# Patient Record
Sex: FEMALE | Race: WHITE | NOT HISPANIC OR LATINO | Employment: FULL TIME | URBAN - METROPOLITAN AREA
[De-identification: names, ages, dates, MRNs, and addresses within clinical notes are randomized per-mention and may not be internally consistent; named-entity substitution may affect disease eponyms.]

---

## 2018-01-18 ENCOUNTER — HISTORICAL (OUTPATIENT)
Dept: ADMINISTRATIVE | Facility: HOSPITAL | Age: 38
End: 2018-01-18

## 2018-01-20 LAB — FINAL CULTURE: NORMAL

## 2019-05-07 ENCOUNTER — OFFICE VISIT (OUTPATIENT)
Dept: UROLOGY | Facility: CLINIC | Age: 39
End: 2019-05-07
Payer: COMMERCIAL

## 2019-05-07 ENCOUNTER — TELEPHONE (OUTPATIENT)
Dept: UROLOGY | Facility: CLINIC | Age: 39
End: 2019-05-07

## 2019-05-07 VITALS
WEIGHT: 293 LBS | BODY MASS INDEX: 41.02 KG/M2 | HEIGHT: 71 IN | HEART RATE: 91 BPM | DIASTOLIC BLOOD PRESSURE: 94 MMHG | SYSTOLIC BLOOD PRESSURE: 143 MMHG | TEMPERATURE: 98 F

## 2019-05-07 DIAGNOSIS — N28.89 LEFT RENAL MASS: Primary | ICD-10-CM

## 2019-05-07 LAB
BILIRUB UR QL STRIP: NEGATIVE
CLARITY UR REFRACT.AUTO: ABNORMAL
COLOR UR AUTO: YELLOW
GLUCOSE UR QL STRIP: NEGATIVE
HGB UR QL STRIP: NEGATIVE
KETONES UR QL STRIP: NEGATIVE
LEUKOCYTE ESTERASE UR QL STRIP: NEGATIVE
NITRITE UR QL STRIP: NEGATIVE
PH UR STRIP: 6 [PH] (ref 5–8)
PROT UR QL STRIP: NEGATIVE
SP GR UR STRIP: 1.02 (ref 1–1.03)
URN SPEC COLLECT METH UR: ABNORMAL

## 2019-05-07 PROCEDURE — 87086 URINE CULTURE/COLONY COUNT: CPT

## 2019-05-07 PROCEDURE — 3008F PR BODY MASS INDEX (BMI) DOCUMENTED: ICD-10-PCS | Mod: CPTII,S$GLB,, | Performed by: UROLOGY

## 2019-05-07 PROCEDURE — 99999 PR PBB SHADOW E&M-NEW PATIENT-LVL III: CPT | Mod: PBBFAC,,, | Performed by: UROLOGY

## 2019-05-07 PROCEDURE — 3008F BODY MASS INDEX DOCD: CPT | Mod: CPTII,S$GLB,, | Performed by: UROLOGY

## 2019-05-07 PROCEDURE — 99999 PR PBB SHADOW E&M-NEW PATIENT-LVL III: ICD-10-PCS | Mod: PBBFAC,,, | Performed by: UROLOGY

## 2019-05-07 PROCEDURE — 99204 OFFICE O/P NEW MOD 45 MIN: CPT | Mod: S$GLB,,, | Performed by: UROLOGY

## 2019-05-07 PROCEDURE — 99204 PR OFFICE/OUTPT VISIT, NEW, LEVL IV, 45-59 MIN: ICD-10-PCS | Mod: S$GLB,,, | Performed by: UROLOGY

## 2019-05-07 PROCEDURE — 81003 URINALYSIS AUTO W/O SCOPE: CPT

## 2019-05-07 RX ORDER — PANTOPRAZOLE SODIUM 40 MG/1
40 TABLET, DELAYED RELEASE ORAL NIGHTLY
Refills: 11 | COMMUNITY
Start: 2019-04-05

## 2019-05-07 NOTE — H&P (VIEW-ONLY)
Subjective:       Patient ID: Betty Faustin is a 38 y.o. female.    Chief Complaint:  Other (renal mass)      History of Present Illness  HPI  Patient is a 38 y.o. female who is new to our clinic and referred by their urologist, Dr. Villarreal for evaluation of a left renal mass.   She developed diarrhea and GI upset which prompted abdominal imaging.  She had an incidental finding of a left renal mass.    She denies any nausea/vomiting.  She denies gross hematuria.  No fh of  malignancy.   Remote history of lap CCY and .  No blood thinners.  No MI/CVA in the past.  No chest pain or shortness of breath.     Review of Systems  Review of Systems  All other systems reviewed and negative except pertinent positives noted in HPI.       Objective:     Physical Exam   Constitutional: She is oriented to person, place, and time. She appears well-developed and well-nourished. She is cooperative.  Non-toxic appearance.   HENT:   Head: Normocephalic.   Cardiovascular: Normal rate, intact distal pulses and normal pulses.    Pulmonary/Chest: Effort normal. No accessory muscle usage. No tachypnea. No respiratory distress.   Abdominal: Soft. Normal appearance. She exhibits no distension, no fluid wave, no ascites and no mass. There is no tenderness. There is no rebound and no CVA tenderness. No hernia.   Liver/spleen non-palpable   Musculoskeletal: Normal range of motion.   Neurological: She is alert and oriented to person, place, and time. She has normal strength.   Skin: No bruising and no rash noted.     Psychiatric: She has a normal mood and affect. Her speech is normal and behavior is normal. Thought content normal.       Lab Review  No results found for: COLORU, SPECGRAV, PHUR, WBCUR, NITRITE, PROTEINUR, GLUCOSEUR, KETONESU, UROBILINOGEN, BILIRUBINUR, RBCUR      Assessment:        1. Left renal mass            Plan:     Left renal mass  -     Urine culture; Future; Expected date: 2019  -     Urinalysis; Future;  Expected date: 05/07/2019    -CT scan was independently reviewed today and reveals partially endophytic left upper pole renal mass, 2.6cm, RENAL: 1, 2, 2, a, 1    -I have explained the risk, benefits, and alternatives of the procedure in detail.  The risks including but not limited to bleeding, pseudoaneurysm, AVM, injury to adjacent structures including the spleen, liver, lung, pancreas, colon and intestines, blood vessels in the abdomen including the renal artery or renal vein, ureter, loss of kidney, urinoma or urinary fistula, or need for conversion to open or radical nephrectomy were explained to the patient in depth. The patient voices understanding and all questions have been answered. The patient agrees to proceed as planned with a left robotic partial nephrectomy.  -ua/urine culture today  -f/u for surgery 5/31.

## 2019-05-07 NOTE — LETTER
May 7, 2019      Kyle Marks MD  [address]  [place] [address]           [facility]  [address]  [place] [address]  Phone: [phone]          Patient: Betty Faustin   MR Number: [id number]   YOB: 1980   Date of Visit: 5/7/2019       Dear Dr. Kyle Marks:    Thank you for referring Betty Faustin to me for evaluation. Attached you will find relevant portions of my assessment and plan of care.    If you have questions, please do not hesitate to call me. I look forward to following Betty Faustin along with you.    Sincerely,    Srinath Killian MD    Enclosure  CC:  No Recipients    If you would like to receive this communication electronically, please contact [email or web address] or [phone] to request more information on [employer] Link access.    For providers and/or their staff who would like to refer a patient to [facility], please contact us through our one-stop-shop provider referral line, [facility], at [phone].    If you feel you have received this communication in error or would no longer like to receive these types of communications, please e-mail [email or web address]

## 2019-05-08 LAB — BACTERIA UR CULT: NO GROWTH

## 2019-05-09 ENCOUNTER — TELEPHONE (OUTPATIENT)
Dept: UROLOGY | Facility: CLINIC | Age: 39
End: 2019-05-09

## 2019-05-10 ENCOUNTER — TELEPHONE (OUTPATIENT)
Dept: PREADMISSION TESTING | Facility: HOSPITAL | Age: 39
End: 2019-05-10

## 2019-05-10 ENCOUNTER — ANESTHESIA EVENT (OUTPATIENT)
Dept: SURGERY | Facility: HOSPITAL | Age: 39
DRG: 657 | End: 2019-05-10
Payer: COMMERCIAL

## 2019-05-10 DIAGNOSIS — Z01.818 PREOPERATIVE TESTING: Primary | ICD-10-CM

## 2019-05-10 NOTE — TELEPHONE ENCOUNTER
----- Message from Hue Gutierrez RN sent at 5/10/2019 10:23 AM CDT -----  Surgery date: 5/31/2019  PreOp appt: Pt requesting AM appts on 5/30/2019, she is coming from [place], [place].    Please call Pt and schedule the following preop appts:    POC  Dr. Rahman  Lab    Thank you!  Hue

## 2019-05-10 NOTE — PRE ADMISSION SCREENING
Anesthesia Assessment: Preoperative EQUATION    Planned Procedure: Procedure(s) (LRB):  XI ROBOTIC NEPHRECTOMY, PARTIAL (Left)  Requested Anesthesia Type:General  Surgeon: Srinath Killian MD  Service: Urology  Known or anticipated Date of Surgery:5/31/2019    Surgeon notes: reviewed    Previous anesthesia records:Not available    Last PCP note: Pt stated does not see a PCP  Subspecialty notes: None    Other important co-morbidities: Renal Mass     Tests already available:  No recent tests.            Instructions given. (See in Nurse's note)    Optimization:  Anesthesia Preop Clinic Assessment  Indicated.    Medical Opinion Indicated.           Plan:    Testing:  Hematology Profile, CMP and T&S   Pre-anesthesia  visit       Visit focus: concerns in complex and/or prolonged anesthesia, position other than supine     Consultation:IM Perioperative Hospitalist     Patient  has previously scheduled Medical Appointment: Not at this time prior to surgery    Navigation: Tests Scheduled.              Consults scheduled.             Results will be tracked by Preop Clinic.    Case presented to and discussed with Dr. Burt, no further testing needed (EKG).

## 2019-05-10 NOTE — ANESTHESIA PREPROCEDURE EVALUATION
Hue Gutierrez, RN   Registered Nurse      Pre Admission Screening   Signed                     Anesthesia Assessment: Preoperative EQUATION     Planned Procedure: Procedure(s) (LRB):  XI ROBOTIC NEPHRECTOMY, PARTIAL (Left)  Requested Anesthesia Type:General  Surgeon: Srinath Killian MD  Service: Urology  Known or anticipated Date of Surgery:2019     Surgeon notes: reviewed     Previous anesthesia records:Not available     Last PCP note: Pt stated does not see a PCP  Subspecialty notes: None     Other important co-morbidities: Renal Mass     Tests already available:  No recent tests.                            Instructions given. (See in Nurse's note)     Optimization:  Anesthesia Preop Clinic Assessment  Indicated.    Medical Opinion Indicated.                                        Plan:    Testing:  Hematology Profile, CMP and T&S   Pre-anesthesia  visit                                        Visit focus: concerns in complex and/or prolonged anesthesia, position other than supine                           Consultation:IM Perioperative Hospitalist                           Patient  has previously scheduled Medical Appointment: Not at this time prior to surgery     Navigation: Tests Scheduled.                         Consults scheduled.                        Results will be tracked by Preop Clinic.     Case presented to and discussed with Dr. Burt, no further testing needed (EKG).                                                                                                                           05/10/2019  Betty Faustin is a 38 y.o., female.    Anesthesia Evaluation         Review of Systems  Anesthesia Hx:  No problems with previous Anesthesia History of prior surgery of interest to airway management or planning: Previous anesthesia: General 2008: Emergency  with general anesthesia.  Denies Family Hx of Anesthesia complications.   Denies Personal Hx of Anesthesia complications.    Social:  Non-Smoker  Patient's occupation is . Denies Tobacco Use. Denies Alcohol Use.   EENT/Dental:   Denies Throat Symptoms Denies Jaw Problems   Cardiovascular:  Functional Capacity good / => 4 METS, can walk up one flight of stairs: denies CP/SOB  Denies Coronary Artery Disease.  Denies Deep Venous Thrombosis (DVT)  Hypertension (no meds at this time- in past)    Pulmonary:  Pulmonary Normal  Denies Asthma.  Denies Chronic Obstructive Pulmonary Disease (COPD).  Possible Obstructive Sleep Apnea , (STOP/BANG) Symptoms A - Age > 50    Renal/:   Left renal mass Denies Kidney Function/Disease    Hepatic/GI:   GERD, well controlled  Esophageal / Stomach Disorders Gerd Controlled by chronic antireflux medication.  Denies Liver Disease    Musculoskeletal:  Denies Joint Disease     Neurological:  Neurology Normal  Denies Seizure Disorder  Denies CVA - Cerebrovasular Accident  Denies TIA - Transient Ischemic Attack    Endocrine:  Endocrine Normal  Denies Diabetes  Denies Thyroid Disease  Metabolic Disorders, Morbid Obesity / BMI > 40      Physical Exam  General:  Morbid Obesity    Airway/Jaw/Neck:  Airway Findings: Mouth Opening: Normal Tongue: Normal  General Airway Assessment: Adult  Improves to II with phonation.  TM Distance: Normal, at least 6 cm  Jaw/Neck Findings:  Neck ROM: Normal ROM      Dental:  Dental Findings: In tact        Mental Status:  Mental Status Findings:  Cooperative, Alert and Oriented         Anesthesia Plan  Type of Anesthesia, risks & benefits discussed:  Anesthesia Type:  general  Patient's Preference:   Intra-op Monitoring Plan: standard ASA monitors and arterial line  Intra-op Monitoring Plan Comments:   Post Op Pain Control Plan: per primary service following discharge from PACU and multimodal analgesia  Post Op Pain Control Plan Comments:   Induction:   IV  Beta Blocker:  Patient is not currently on a Beta-Blocker (No further documentation required).       Informed Consent:  Patient understands risks and agrees with Anesthesia plan.  Questions answered. Anesthesia consent signed with patient.  ASA Score: 2     Day of Surgery Review of History & Physical:    H&P update referred to the surgeon.         Ready For Surgery From Anesthesia Perspective.     The patient was seen by Perioperative Internal Medicine physician Dr. Rahman on 5/30/19 , please see recommendations.        Hattie Meyer RN

## 2019-05-10 NOTE — PRE-PROCEDURE INSTRUCTIONS
Reviewed instructions regarding holding vitamins, herbal supplements and NSAIDS one week prior to surgery;  Pt verbalized understanding.

## 2019-05-10 NOTE — TELEPHONE ENCOUNTER
Appt. 5/30  When I spoke with patient she said, She was told that she could come in the day before surgery.

## 2019-05-30 ENCOUNTER — HOSPITAL ENCOUNTER (OUTPATIENT)
Dept: CARDIOLOGY | Facility: CLINIC | Age: 39
Discharge: HOME | End: 2019-05-30
Payer: COMMERCIAL

## 2019-05-30 ENCOUNTER — HOSPITAL ENCOUNTER (OUTPATIENT)
Dept: PREADMISSION TESTING | Facility: HOSPITAL | Age: 39
Discharge: HOME | DRG: 657 | End: 2019-05-30
Attending: ANESTHESIOLOGY
Payer: COMMERCIAL

## 2019-05-30 ENCOUNTER — INITIAL CONSULT (OUTPATIENT)
Dept: INTERNAL MEDICINE | Facility: CLINIC | Age: 39
DRG: 657 | End: 2019-05-30
Payer: COMMERCIAL

## 2019-05-30 VITALS
DIASTOLIC BLOOD PRESSURE: 88 MMHG | HEART RATE: 79 BPM | WEIGHT: 293 LBS | BODY MASS INDEX: 41.02 KG/M2 | HEIGHT: 71 IN | TEMPERATURE: 98 F | OXYGEN SATURATION: 97 % | SYSTOLIC BLOOD PRESSURE: 126 MMHG

## 2019-05-30 DIAGNOSIS — I10 ESSENTIAL HYPERTENSION: ICD-10-CM

## 2019-05-30 DIAGNOSIS — R79.89 ELEVATED PLATELET COUNT: ICD-10-CM

## 2019-05-30 DIAGNOSIS — E66.01 MORBID OBESITY: ICD-10-CM

## 2019-05-30 DIAGNOSIS — I10 ESSENTIAL HYPERTENSION: Primary | ICD-10-CM

## 2019-05-30 DIAGNOSIS — Z01.818 PREOP EXAMINATION: Primary | ICD-10-CM

## 2019-05-30 DIAGNOSIS — K21.9 GASTROESOPHAGEAL REFLUX DISEASE, ESOPHAGITIS PRESENCE NOT SPECIFIED: ICD-10-CM

## 2019-05-30 DIAGNOSIS — N28.89 RENAL MASS: ICD-10-CM

## 2019-05-30 PROCEDURE — 99244 PR OFFICE CONSULTATION,LEVEL IV: ICD-10-PCS | Mod: S$GLB,,, | Performed by: HOSPITALIST

## 2019-05-30 PROCEDURE — 99244 OFF/OP CNSLTJ NEW/EST MOD 40: CPT | Mod: S$GLB,,, | Performed by: HOSPITALIST

## 2019-05-30 PROCEDURE — 99999 PR PBB SHADOW E&M-EST. PATIENT-LVL II: ICD-10-PCS | Mod: PBBFAC,,, | Performed by: HOSPITALIST

## 2019-05-30 PROCEDURE — 99999 PR PBB SHADOW E&M-EST. PATIENT-LVL II: CPT | Mod: PBBFAC,,, | Performed by: HOSPITALIST

## 2019-05-30 PROCEDURE — 93010 EKG 12-LEAD: ICD-10-PCS | Mod: S$GLB,,, | Performed by: INTERNAL MEDICINE

## 2019-05-30 PROCEDURE — 93010 ELECTROCARDIOGRAM REPORT: CPT | Mod: S$GLB,,, | Performed by: INTERNAL MEDICINE

## 2019-05-30 PROCEDURE — 93005 ELECTROCARDIOGRAM TRACING: CPT | Mod: S$GLB,,, | Performed by: ANESTHESIOLOGY

## 2019-05-30 PROCEDURE — 93005 EKG 12-LEAD: ICD-10-PCS | Mod: S$GLB,,, | Performed by: ANESTHESIOLOGY

## 2019-05-30 NOTE — ASSESSMENT & PLAN NOTE
Was on treatment for HTN  Had to have emergency C section in 2008 because of HTN -   Was on treatment about 4515-8487   Restarted BP Medication 1855-2905   Could not alannah the name ( possibly Metoprolol )   Home BP readings -120-140/80-90  Hypertension-  Blood pressure is acceptable .I suggest  blood pressure monitoring .I suggest addressing pain control as uncontrolled pain can increased blood pressure   She plans on following for HTN  Weight loss , salt reduction suggested

## 2019-05-30 NOTE — OUTPATIENT SUBJECTIVE & OBJECTIVE
Outpatient Subjective & Objective     Chief complaint-Preoperative evaluation, Perioperative Medical management, complication reduction plan     Active cardiac conditions- none    Revised cardiac risk index predictors- none    Functional capacity -Examples of physical activity  house work, can take 1 flight of stairs, cutting the grass with a mower, painting and weeding garden----- She can undertake all the above activities without  chest pain,chest tightness, Shortness of breath ,dizziness,lightheadedness making her exercise tolerance more,   than 4 Mets.       Review of Systems   Constitutional: Negative for chills and fever.        No unusual weight changes recently        HENT:        STOPBANG score  3/ 8    HTN  BMI> 35   Neck size over 40 CM     Eyes:        No new visual changes   Respiratory:        No cough , phlegm    No Hemoptysis   Cardiovascular:        As noted   Gastrointestinal:        No overt GI/ blood losses  Bowel movements- Regular   LMP- 5/21/2019   Regular cycles, not heavy   Had tubal ligation with the last child   Endocrine:        Prednisone use > 20 mg daily for 3 weeks- None    Genitourinary: Negative for dysuria.        No urinary hesitancy    Musculoskeletal:          No unusual, muscle, joint pains   Skin: Negative for rash.   Neurological: Negative for syncope.        No unilateral weakness   Hematological:        Current use of Anticoagulants  Current use of Antiplatelet agents  None    Psychiatric/Behavioral:        No Depression,Anxiety     no vascular stenting     No past medical history pertinent negatives.  No family history on file.  No past surgical history on file.    No anesthesia, bleeding, cardiac problems , PONV with previous surgeries/procedures.  Medications and Allergies reviewed in epic.   FH- No anesthesia,bleeding / venous thrombosis ,  in family   Gets cancer screening   Physical Exam      Physical Exam  Constitutional-   General  appearance-Conscious,Coherent  Eyes- No conjunctival icterus,pupils  round  and reactive to light   ENT-Oral cavity- moist  , Hearing grossly normal   Neck- No thyromegaly ,Trachea -central, No jugular venous distension,   No Carotid Bruit   Cardiovascular -Heart Sounds- Normal  and  no murmur   , No gallop rhythm   Respiratory - Normal Respiratory Effort, Normal breath sounds,  no wheeze  and  no forced expiratory wheeze    Peripheral pitting pedal edema-- none , no calf pain   Gastrointestinal -Soft abdomen, No palpable masses, Non Tender,Liver,Spleen not palpable. No-- free fluid and shifting dullness  Musculoskeletal- No finger Clubbing. Strength grossly normal   Lymphatic-No Palpable cervical, axillary,Inguinal lymphadenopathy   Psychiatric - normal effect,Orientation  Rt Dorsalis pedis pulses-palpable    Lt Dorsalis pedis pulses- palpable   Rt Posterior tibial pulses -palpable   Left posterior tibial pulses -palpable   Miscellaneous - no sign of infection  open area skin , lower abdomen ,  no asterixis,  no dupuytren's contracture and  no renal bruit  Investigations  Possible pancreas cysts on MRI- suggested follow up       Review of old records- Was done and information gathered regards to events leading to surgery and health conditions of significance in the perioperative period.    Outpatient Subjective & Objective

## 2019-05-30 NOTE — PROGRESS NOTES
[facility] - Pre Op Consult  Progress Note    Patient Name: Betty Faustin  MRN: [id number]  Date of Evaluation- 05/30/2019  PCP- Primary Doctor No    Future cases for Betty Faustin [[id number]]     Case ID Status Date Time [place] Procedure Provider Location    [id number] [facility] 5/31/2019 12:00  XI ROBOTIC NEPHRECTOMY, PARTIAL Srinath Killian MD [[id number]] [facility] OR 2ND FLR      Left     HPI:  History of present illness- I had the pleasure of meeting this pleasant 38 y.o. lady in the pre op clinic prior to her elective Urological surgery. The patient is new to me     I have obtained the history by speaking to the patient and by reviewing the electronic health records.    Events leading up to surgery / History of presenting illness -    Left renal mass    Went to Emergency room March 17 th 2019 with diarrhea, abdominal pain ( upper abdomina, no radiation , no relation to food ) . No similar symptoms in the family ( spouse , 2 children )  Lasted for 4 days . During the evaluation of abdominal pain , was incidentally noted to have Left renal mass  Was given antibiotic Ciprofloxacin for 10 days and was discharged. The  diarrhea , and abdominal lasted for 1-2 days and resolved . Finished the antibiotic course that she tolerated well   Saw local urologist and was referred to [facility] , but had insurance issues and hence came to [facility]     No pain from this .No aggravating factors. No relieving factors     Relevant health conditions of significance for the perioperative period/ History of presenting illness -    Lives with  and 2 boys ( 10 Y, 1.5 Y )  Works as a  at [employer]    Physical activities- House work, stays active   Lives in a farm , has chickens, ducks   Doers fishing , camping  Lives in a single house     Patient Active Problem List    Diagnosis Date Noted    Renal mass 05/30/2019    Acid reflux 05/30/2019    Morbid obesity 05/30/2019    Essential hypertension 05/30/2019    Elevated platelet  count 05/30/2019     Not known to have heart disease , Diabetes Mellitus, Lung disease       Subjective/ Objective:          Chief complaint-Preoperative evaluation, Perioperative Medical management, complication reduction plan     Active cardiac conditions- none    Revised cardiac risk index predictors- none    Functional capacity -Examples of physical activity  house work, can take 1 flight of stairs, cutting the grass with a mower, painting and weeding garden----- She can undertake all the above activities without  chest pain,chest tightness, Shortness of breath ,dizziness,lightheadedness making her exercise tolerance more,   than 4 Mets.       Review of Systems   Constitutional: Negative for chills and fever.        No unusual weight changes recently        HENT:        STOPBANG score  3/ 8    HTN  BMI> 35   Neck size over 40 CM     Eyes:        No new visual changes   Respiratory:        No cough , phlegm    No Hemoptysis   Cardiovascular:        As noted   Gastrointestinal:        No overt GI/ blood losses  Bowel movements- Regular   LMP- 5/21/2019   Regular cycles, not heavy   Had tubal ligation with the last child   Endocrine:        Prednisone use > 20 mg daily for 3 weeks- None    Genitourinary: Negative for dysuria.        No urinary hesitancy    Musculoskeletal:          No unusual, muscle, joint pains   Skin: Negative for rash.   Neurological: Negative for syncope.        No unilateral weakness   Hematological:        Current use of Anticoagulants  Current use of Antiplatelet agents  None    Psychiatric/Behavioral:        No Depression,Anxiety     no vascular stenting     No past medical history pertinent negatives.  No family history on file.  No past surgical history on file.    No anesthesia, bleeding, cardiac problems , PONV with previous surgeries/procedures.  Medications and Allergies reviewed in epic.   FH- No anesthesia,bleeding / venous thrombosis ,  in family   Gets cancer screening    Physical Exam      Physical Exam  Constitutional-   General appearance-Conscious,Coherent  Eyes- No conjunctival icterus,pupils  round  and reactive to light   ENT-Oral cavity- moist  , Hearing grossly normal   Neck- No thyromegaly ,Trachea -central, No jugular venous distension,   No Carotid Bruit   Cardiovascular -Heart Sounds- Normal  and  no murmur   , No gallop rhythm   Respiratory - Normal Respiratory Effort, Normal breath sounds,  no wheeze  and  no forced expiratory wheeze    Peripheral pitting pedal edema-- none , no calf pain   Gastrointestinal -Soft abdomen, No palpable masses, Non Tender,Liver,Spleen not palpable. No-- free fluid and shifting dullness  Musculoskeletal- No finger Clubbing. Strength grossly normal   Lymphatic-No Palpable cervical, axillary,Inguinal lymphadenopathy   Psychiatric - normal effect,Orientation  Rt Dorsalis pedis pulses-palpable    Lt Dorsalis pedis pulses- palpable   Rt Posterior tibial pulses -palpable   Left posterior tibial pulses -palpable   Miscellaneous - no sign of infection  open area skin , lower abdomen ,  no asterixis,  no dupuytren's contracture and  no renal bruit  Investigations  Possible pancreas cysts on MRI- suggested follow up       Review of old records- Was done and information gathered regards to events leading to surgery and health conditions of significance in the perioperative period.        Preoperative cardiac risk assessment-  The patient does not have any active cardiac conditions . Revised cardiac risk index predictors -0 - ---.Functional capacity is more than 4 Mets. She will be undergoing a Urological procedure that carries a intermediate risk     Risk of a major Cardiac event ( Defined as death, myocardial infarction, or cardiac arrest at 30 days after noncardiac surgery), based on RCRI score     -3.9%       No further cardiac work up is indicated prior to proceeding with the surgery     Orders Placed This Encounter    Hemoglobin A1c        American Society of Anesthesiologists Physical status classification ( ASA ) class: 3     Postoperative pulmonary complication risk assessment:      ARISCAT ( Canet) risk index- risk class -  Low     Arozullah Respiratory failure index- percentage risk of respiratory failure: 0.5 %     Assessment/Plan:     Renal mass  For surgery    Acid reflux  Feels chest discomfort - upper chest, throat   Burning  Notices , if misses Protonix for 1 day   On Protonix since pregnant with the first child ( since 2008 )  Protonix helps  Not related to physical activities  Triggers- Food, weight , Caffeine , Chocolate     Morbid obesity  Not known to  have sleep apnea , diabetes , fatty liver  No consistent snoring ,no apnea  Encouraged weight loss    Essential hypertension  Was on treatment for HTN  Had to have emergency C section in 2008 because of HTN -   Was on treatment about 5578-3051   Restarted BP Medication 5564-6590   Could not alannah the name ( possibly Metoprolol )   Home BP readings -120-140/80-90  Hypertension-  Blood pressure is acceptable .I suggest  blood pressure monitoring .I suggest addressing pain control as uncontrolled pain can increased blood pressure   She plans on following for HTN  Weight loss , salt reduction suggested     Elevated platelet count  Platelet count mildly elevated 496 from 5/30/3019 - suggest follow up         Preventive perioperative care    Thromboembolic prophylaxis:  Her risk factors for thrombosis include morbid obesity and surgical procedure.I suggest  thromboembolic prophylaxis ( mechanical/pharmacological, weighing the risk benefits of pharmacological agent use considering [facility]ris procedural bleeding )  during the perioperative period.I suggested being active in the post operative period.      Postoperative pulmonary complication prophylaxis-Risk factors for post operative pulmonary complications include ASA class >2 and proximity of the surgical site to the lungs- I suggest incentive  "spirometry use, early ambulation, end tidal carbon dioxide monitoring and pain control so as to avoid diaphragmatic splinting  , oral care , head end of bed elevation      Renal complication prophylaxis- I suggest keeping her well hydrated .I suggested drinking 2 litre's of water a day   Suggested avoidance of soft drinks - contribute to weight      Surgical site Infection Prophylaxis-I  suggest appropriate antibiotic for Prophylaxis against Surgical site infections     In view of urological procedure the patient  is at risk of postoperative urinary retention.  I suggest avoidance / minimizing the of  Benzodiazepines,Anticholinergic medication,antihistamines ( Benadryl) , if possible in the perioperative period. I suggest using the minimum possible use of opioids for the minimum period of time in the perioperative period. Benadryl avoidance suggested      This visit was focused on Preoperative evaluation, Perioperative Medical management, complication reduction plans. I suggest that the patient follows up with primary care or relevant sub specialists for ongoing health care.    I appreciate the opportunity to be involved in this patients care. Please feel free to contact me if there were any questions about this consultation.    Patient is optimized     Patient  was instructed to call and update me about any changes to health,  medication, office visits ,testing out side of the [facility] , hospitalizations between now and surgery     Sumi Rahman MD  Perioperative Medicine  [facility]   Pager [phone]  ---    5/30- 15 43     CMP- apart from , mildly low anion gap is unremarkable   A1c - N at 5.1   Hb, HCT -N  Platelet count mildly elevated 496 from 5/30/3019 - suggest follow up   /88 Comment: right  Pulse 79   Temp 97.9 °F (36.6 °C)   Ht 5' 11" (1.803 m)   Wt 133.9 kg (295 lb 3.2 oz)   SpO2 97%   BMI 41.17 kg/m²      Called and spoke to her   Elevated PLT count " discussed  Suggested repeat count in 4-8 weeks  Called to follow up , spoke to her  to address any concerns with the up coming surgery or any questions on Medication instructions   Not  breast feeding   Call, if needed

## 2019-05-30 NOTE — ASSESSMENT & PLAN NOTE
Feels chest discomfort - upper chest, throat   Burning  Notices , if misses Protonix for 1 day   On Protonix since pregnant with the first child ( since 2008 )  Protonix helps  Not related to physical activities  Triggers- Food, weight , Caffeine , Chocolate

## 2019-05-30 NOTE — PATIENT INSTRUCTIONS
Tips to Control Acid Reflux    To control acid reflux, youll need to make some basic diet and lifestyle changes. The simple steps outlined below may be all youll need to ease discomfort.  Watch what you eat  · Avoid fatty foods and spicy foods.  · Eat fewer acidic foods, such as citrus and tomato-based foods. These can increase symptoms.  · Limit drinking alcohol, caffeine, and fizzy beverages. All increase acid reflux.  · Try limiting chocolate, peppermint, and spearmint. These can worsen acid reflux in some people.  Watch when you eat  · Avoid lying down for 3 hours after eating.  · Do not snack before going to bed.  Raise your head  Raising your head and upper body by 4 to 6 inches helps limit reflux when youre lying down. Put blocks under the head of your bed frame to raise it.  Other changes  · Lose weight, if you need to  · Dont exercise near bedtime  · Avoid tight-fitting clothes  · Limit aspirin and ibuprofen  · Stop smoking   Date Last Reviewed: 7/1/2016  © 2698-1935 [employer]. [address] [place] [address], [place] [address]. All rights reserved. This information is not intended as a substitute for professional medical care. Always follow your healthcare professional's instructions.

## 2019-05-30 NOTE — ASSESSMENT & PLAN NOTE
Not known to  have sleep apnea , diabetes , fatty liver  No consistent snoring ,no apnea  Encouraged weight loss

## 2019-05-30 NOTE — DISCHARGE INSTRUCTIONS
Your surgery has been scheduled for:__________________________________________    You should report to:  ____[facility], located on the [place] side of the first floor of the           [facility] ([phone])  ____[facility], located on the [facility] side of the            Second floor of the [facility] ([phone])  ____3rd Floor [facility] located on the [facility] side of the [facility] [phone]  Please Note   - Tell your doctor if you take Aspirin, products containing Aspirin, herbal medications  or blood thinners, such as Coumadin, Ticlid, or Plavix.  (Consult your provider regarding holding or stopping before surgery).  - Arrange for someone to drive you home following surgery.  You will not be allowed to leave the surgical facility alone or drive yourself home following sedation and anesthesia.  Before Surgery  - Stop taking all herbal medications 14days prior to surgery  - No Motrin/Advil (Ibuprofen) 7 days before surgery  - No Aleve (Naproxen) 7 days before surgery  - Stop Taking Asprin, products containing Asprin _____days before surgery  - Stop taking blood thinners_______days before surgery  - No Goody's/BC  Powder 7 days before surgery  - Refrain from drinking alcoholic beverages for 24hours before and after surgery  - Stop or limit smoking _________days before surgery  - You may take Tylenol for pain  Night before Surgery  Stop ALL solid food, gum, candy (including vitamins) 8 hours before arrival time.  (Please note: If your surgeon gives you different eating and drinking instructions, please follow surgeon's directions.)  Stop all CLOUDY liquids: coffee with creamer, formula, tube feeds, cloudy juices, non-human milk and breast milk with additives, 6 hours prior to arrival time.  Stop plain breast milk 4 hours prior to arrival time.  The patient should be ENCOURAGED to drink carbohydrate-rich  clear liquids (sports drinks, clear juices) until 2 hours prior to arrival time.  CLEAR liquids include only water, black coffee NO creamer, clear oral rehydration drinks, clear sports drinks or clear fruit juices (no orange juice, no pulpy juices, no apple cider). Advise patients if they can read newsprint through the liquid, it qualifies as clear liquid.   IF IN DOUBT, drink water instead.   - Take a shower or bath (shower is recommended).  Bathe with Hibiclens soap or an antibacterial soap from the neck down.  If not supplied by your surgeon, hibiclens soap will need to be purchased over the counter in pharmacy.  Rinse soap off thoroughly.  - Shampoo your hair with your regular shampoo  The Day of Surgery  · NOTHING TO  DRINK 2 hours before arrival time. If you are told to take medication on the morning of surgery, it may be taken with a sip of water.   - Take another bath or shower with hibiclens or any antibacterial soap, to reduce the chance of infection.  - Take heart and blood pressure medications with a small sip of water, as advised by the perioperative team.  - Do not take fluid pills  - You may brush your teeth and rinse your mouth, but do not swall any additional water.   - Do not apply perfumes, powder, body lotions or deodorant on the day of surgery.  - Nail polish should be removed.  - Do not wear makeup or moisturizer  - Wear comfortable clothes, such as a button front shirt and loose fitting pants.  - Leave all jewelry, including body piercings, and valuables at home.    - Bring any devices you will neeed after surgery such as crutches or canes.  - If you have sleep apnea, please bring your CPAP machine  In the event that your physical condition changes including the onset of a cold or respiratory illness, or if you have to delay or cancel your surgery, please notify your surgeon.  Anesthesia: General Anesthesia     You are watched continuously during your procedure by your anesthesia provider.      Youre due to have surgery. During surgery, youll be given medicine called anesthesia or anesthetic. This will keep you comfortable and pain-free. Your anesthesia provider will use general anesthesia.  What is general anesthesia?  General anesthesia puts you into a state like deep sleep. It goes into the bloodstream (IV anesthetics), into the lungs (gas anesthetics), or both. You feel nothing during the procedure. You will not remember it. During the procedure, the anesthesia provider monitors you continuously. He or she checks your heart rate and rhythm, blood pressure, breathing, and blood oxygen.  · IV anesthetics. IV anesthetics are given through an IV line in your arm. Theyre often given first. This is so you are asleep before a gas anesthetic is started. Some kinds of IV anesthetics relieve pain. Others relax you. Your doctor will decide which kind is best in your case.  · Gas anesthetics. Gas anesthetics are breathed into the lungs. They are often used to keep you asleep. They can be given through a facemask or a tube placed in your larynx or trachea (breathing tube).  ? If you have a facemask, your anesthesia provider will most likely place it over your nose and mouth while youre still awake. Youll breathe oxygen through the mask as your IV anesthetic is started. Gas anesthetic may be added through the mask.  ? If you have a tube in the larynx or trachea, it will be inserted into your throat after youre asleep.  Anesthesia tools and medicines  You will likely have:  · IV anesthetics. These are put into an IV line into your bloodstream.  · Gas anesthetics. You breathe these anesthetics into your lungs, where they pass into your bloodstream.  · Pulse oximeter. This is a small clip that is attached to the end of your finger. This measures your blood oxygen level.  · Electrocardiography leads (electrodes). These are small sticky pads that are placed on your chest. They record your heart rate and  rhythm.  · Blood pressure cuff. This reads your blood pressure.  Risks and possible complications  General anesthesia has some risks. These include:  · Breathing problems  · Nausea and vomiting  · Sore throat or hoarseness (usually temporary)  · Allergic reaction to the anesthetic  · Irregular heartbeat (rare)  · Cardiac arrest (rare)   Anesthesia safety  · Follow all instructions you are given for how long not to eat or drink before your procedure.  · Be sure your doctor knows what medicines and drugs you take. This includes over-the-counter medicines, herbs, supplements, alcohol or other drugs. You will be asked when those were last taken.  · Have an adult family member or friend drive you home after the procedure.  · For the first 24 hours after your surgery:  ? Do not drive or use heavy equipment.  ? Do not make important decisions or sign legal documents. If important decisions or signing legal documents is necessary during the first 24 hours after surgery, have a trusted family member or spouse act on your behalf.  ? Avoid alcohol.  ? Have a responsible adult stay with you. He or she can watch for problems and help keep you safe.  Date Last Reviewed: 12/1/2016  © 3167-9620 [employer]. [address] [place] [address], [place] [address]. All rights reserved. This information is not intended as a substitute for professional medical care. Always follow your healthcare professional's instructions

## 2019-05-30 NOTE — LETTER
May 30, 2019      Elena Burt MD  [address] [facility] [address]           [facility]  [address] [facility] [phone]  Phone: [phone]          Patient: Betty Faustin   MR Number: [id number]   YOB: 1980   Date of Visit: 5/30/2019       Dear Dr. Elena Burt:    Thank you for referring Betty Faustin to me for evaluation. Attached you will find relevant portions of my assessment and plan of care.    If you have questions, please do not hesitate to call me. I look forward to following Betty Faustin along with you.    Sincerely,    Sumi Rahman MD    Enclosure  CC:  Srinath Killian MD    If you would like to receive this communication electronically, please contact [email or web address] or [phone] to request more information on [employer] Link access.    For providers and/or their staff who would like to refer a patient to [facility], please contact us through our one-stop-shop provider referral line, [facility], at [phone].    If you feel you have received this communication in error or would no longer like to receive these types of communications, please e-mail [email or web address]

## 2019-05-30 NOTE — HPI
History of present illness- I had the pleasure of meeting this pleasant 38 y.o. lady in the pre op clinic prior to her elective Urological surgery. The patient is new to me     I have obtained the history by speaking to the patient and by reviewing the electronic health records.    Events leading up to surgery / History of presenting illness -    Left renal mass    Went to Emergency room March 17 th 2019 with diarrhea, abdominal pain ( upper abdomina, no radiation , no relation to food ) . No similar symptoms in the family ( spouse , 2 children )  Lasted for 4 days . During the evaluation of abdominal pain , was incidentally noted to have Left renal mass  Was given antibiotic Ciprofloxacin for 10 days and was discharged. The  diarrhea , and abdominal lasted for 1-2 days and resolved . Finished the antibiotic course that she tolerated well   Saw local urologist and was referred to [facility] , but had insurance issues and hence came to [facility]     No pain from this .No aggravating factors. No relieving factors     Relevant health conditions of significance for the perioperative period/ History of presenting illness -    Lives with  and 2 boys ( 10 Y, 1.5 Y )  Works as a  at [employer]    Physical activities- House work, stays active   Lives in a farm , has chickens, ducks   Doers fishing , camping  Lives in a single house     Patient Active Problem List    Diagnosis Date Noted    Renal mass 05/30/2019    Acid reflux 05/30/2019    Morbid obesity 05/30/2019    Essential hypertension 05/30/2019    Elevated platelet count 05/30/2019     Not known to have heart disease , Diabetes Mellitus, Lung disease

## 2019-05-31 ENCOUNTER — HOSPITAL ENCOUNTER (INPATIENT)
Facility: HOSPITAL | Age: 39
LOS: 1 days | Discharge: HOME | DRG: 657 | End: 2019-06-01
Attending: UROLOGY | Admitting: UROLOGY
Payer: COMMERCIAL

## 2019-05-31 ENCOUNTER — ANESTHESIA (OUTPATIENT)
Dept: SURGERY | Facility: HOSPITAL | Age: 39
DRG: 657 | End: 2019-05-31
Payer: COMMERCIAL

## 2019-05-31 DIAGNOSIS — N28.89 RENAL MASS: Primary | ICD-10-CM

## 2019-05-31 LAB
ANION GAP SERPL CALC-SCNC: 8 MMOL/L (ref 8–16)
B-HCG UR QL: NEGATIVE
BASOPHILS # BLD AUTO: 0.03 K/UL (ref 0–0.2)
BASOPHILS NFR BLD: 0.2 % (ref 0–1.9)
BUN SERPL-MCNC: 18 MG/DL (ref 6–20)
CALCIUM SERPL-MCNC: 8.5 MG/DL (ref 8.7–10.5)
CHLORIDE SERPL-SCNC: 109 MMOL/L (ref 95–110)
CO2 SERPL-SCNC: 20 MMOL/L (ref 23–29)
CREAT SERPL-MCNC: 1 MG/DL (ref 0.5–1.4)
CTP QC/QA: YES
DIFFERENTIAL METHOD: ABNORMAL
EOSINOPHIL # BLD AUTO: 0 K/UL (ref 0–0.5)
EOSINOPHIL NFR BLD: 0 % (ref 0–8)
ERYTHROCYTE [DISTWIDTH] IN BLOOD BY AUTOMATED COUNT: 14.1 % (ref 11.5–14.5)
EST. GFR  (AFRICAN AMERICAN): >60 ML/MIN/1.73 M^2
EST. GFR  (NON AFRICAN AMERICAN): >60 ML/MIN/1.73 M^2
GLUCOSE SERPL-MCNC: 132 MG/DL (ref 70–110)
HCT VFR BLD AUTO: 37.4 % (ref 37–48.5)
HGB BLD-MCNC: 11.9 G/DL (ref 12–16)
IMM GRANULOCYTES # BLD AUTO: 0.07 K/UL (ref 0–0.04)
IMM GRANULOCYTES NFR BLD AUTO: 0.5 % (ref 0–0.5)
LYMPHOCYTES # BLD AUTO: 1.1 K/UL (ref 1–4.8)
LYMPHOCYTES NFR BLD: 8.1 % (ref 18–48)
MCH RBC QN AUTO: 26.9 PG (ref 27–31)
MCHC RBC AUTO-ENTMCNC: 31.8 G/DL (ref 32–36)
MCV RBC AUTO: 84 FL (ref 82–98)
MONOCYTES # BLD AUTO: 0.2 K/UL (ref 0.3–1)
MONOCYTES NFR BLD: 1.1 % (ref 4–15)
NEUTROPHILS # BLD AUTO: 12.3 K/UL (ref 1.8–7.7)
NEUTROPHILS NFR BLD: 90.1 % (ref 38–73)
NRBC BLD-RTO: 0 /100 WBC
PLATELET # BLD AUTO: 417 K/UL (ref 150–350)
PMV BLD AUTO: 9.1 FL (ref 9.2–12.9)
POTASSIUM SERPL-SCNC: 4.3 MMOL/L (ref 3.5–5.1)
RBC # BLD AUTO: 4.43 M/UL (ref 4–5.4)
SODIUM SERPL-SCNC: 137 MMOL/L (ref 136–145)
WBC # BLD AUTO: 13.6 K/UL (ref 3.9–12.7)

## 2019-05-31 PROCEDURE — 27800505 HC CATH, RADIAL ARTERY KIT: Performed by: NURSE ANESTHETIST, CERTIFIED REGISTERED

## 2019-05-31 PROCEDURE — D9220A PRA ANESTHESIA: Mod: CRNA,,, | Performed by: NURSE ANESTHETIST, CERTIFIED REGISTERED

## 2019-05-31 PROCEDURE — D9220A PRA ANESTHESIA: Mod: ANES,,, | Performed by: ANESTHESIOLOGY

## 2019-05-31 PROCEDURE — 50543 LAPARO PARTIAL NEPHRECTOMY: CPT | Mod: AS,LT,, | Performed by: PHYSICIAN ASSISTANT

## 2019-05-31 PROCEDURE — 94799 UNLISTED PULMONARY SVC/PX: CPT

## 2019-05-31 PROCEDURE — D9220A PRA ANESTHESIA: ICD-10-PCS | Mod: ANES,,, | Performed by: ANESTHESIOLOGY

## 2019-05-31 PROCEDURE — 85025 COMPLETE CBC W/AUTO DIFF WBC: CPT

## 2019-05-31 PROCEDURE — 37000009 HC ANESTHESIA EA ADD 15 MINS: Performed by: UROLOGY

## 2019-05-31 PROCEDURE — 37000008 HC ANESTHESIA 1ST 15 MINUTES: Performed by: UROLOGY

## 2019-05-31 PROCEDURE — 11000001 HC ACUTE MED/SURG PRIVATE ROOM

## 2019-05-31 PROCEDURE — 25000003 PHARM REV CODE 250: Performed by: ANESTHESIOLOGY

## 2019-05-31 PROCEDURE — 63600175 PHARM REV CODE 636 W HCPCS: Performed by: ANESTHESIOLOGY

## 2019-05-31 PROCEDURE — S0028 INJECTION, FAMOTIDINE, 20 MG: HCPCS | Performed by: NURSE ANESTHETIST, CERTIFIED REGISTERED

## 2019-05-31 PROCEDURE — 27201423 OPTIME MED/SURG SUP & DEVICES STERILE SUPPLY: Performed by: UROLOGY

## 2019-05-31 PROCEDURE — 81025 URINE PREGNANCY TEST: CPT | Performed by: UROLOGY

## 2019-05-31 PROCEDURE — 71000039 HC RECOVERY, EACH ADD'L HOUR: Performed by: UROLOGY

## 2019-05-31 PROCEDURE — 80048 BASIC METABOLIC PNL TOTAL CA: CPT

## 2019-05-31 PROCEDURE — 36000712 HC OR TIME LEV V 1ST 15 MIN: Performed by: UROLOGY

## 2019-05-31 PROCEDURE — C1729 CATH, DRAINAGE: HCPCS | Performed by: UROLOGY

## 2019-05-31 PROCEDURE — 36620 PR INSERT CATH,ART,PERCUT,SHORTTERM: ICD-10-PCS | Mod: 59,,, | Performed by: ANESTHESIOLOGY

## 2019-05-31 PROCEDURE — 88307 TISSUE EXAM BY PATHOLOGIST: CPT | Mod: 26,,, | Performed by: PATHOLOGY

## 2019-05-31 PROCEDURE — 36620 INSERTION CATHETER ARTERY: CPT | Mod: 59,,, | Performed by: ANESTHESIOLOGY

## 2019-05-31 PROCEDURE — 63600175 PHARM REV CODE 636 W HCPCS: Performed by: UROLOGY

## 2019-05-31 PROCEDURE — 71000033 HC RECOVERY, INTIAL HOUR: Performed by: UROLOGY

## 2019-05-31 PROCEDURE — 25000003 PHARM REV CODE 250: Performed by: UROLOGY

## 2019-05-31 PROCEDURE — 88307 TISSUE EXAM BY PATHOLOGIST: CPT | Performed by: PATHOLOGY

## 2019-05-31 PROCEDURE — 63600175 PHARM REV CODE 636 W HCPCS: Performed by: STUDENT IN AN ORGANIZED HEALTH CARE EDUCATION/TRAINING PROGRAM

## 2019-05-31 PROCEDURE — 36000713 HC OR TIME LEV V EA ADD 15 MIN: Performed by: UROLOGY

## 2019-05-31 PROCEDURE — 25000003 PHARM REV CODE 250: Performed by: NURSE ANESTHETIST, CERTIFIED REGISTERED

## 2019-05-31 PROCEDURE — 76998 PR  ULTRASONIC GUIDANCE, INTRAOPERATIVE: ICD-10-PCS | Mod: 26,,, | Performed by: UROLOGY

## 2019-05-31 PROCEDURE — 76998 US GUIDE INTRAOP: CPT | Mod: 26,,, | Performed by: UROLOGY

## 2019-05-31 PROCEDURE — 50543 PR LAP,PARTIAL NEPHRECTOMY: ICD-10-PCS | Mod: AS,LT,, | Performed by: PHYSICIAN ASSISTANT

## 2019-05-31 PROCEDURE — 27000221 HC OXYGEN, UP TO 24 HOURS

## 2019-05-31 PROCEDURE — 63600175 PHARM REV CODE 636 W HCPCS: Performed by: NURSE ANESTHETIST, CERTIFIED REGISTERED

## 2019-05-31 PROCEDURE — 27100025 HC TUBING, SET FLUID WARMER: Performed by: NURSE ANESTHETIST, CERTIFIED REGISTERED

## 2019-05-31 PROCEDURE — 50543 LAPARO PARTIAL NEPHRECTOMY: CPT | Mod: 22,LT,, | Performed by: UROLOGY

## 2019-05-31 PROCEDURE — 88307 TISSUE SPECIMEN TO PATHOLOGY - SURGERY: ICD-10-PCS | Mod: 26,,, | Performed by: PATHOLOGY

## 2019-05-31 PROCEDURE — 94761 N-INVAS EAR/PLS OXIMETRY MLT: CPT

## 2019-05-31 PROCEDURE — 27201037 HC PRESSURE MONITORING SET UP

## 2019-05-31 PROCEDURE — D9220A PRA ANESTHESIA: ICD-10-PCS | Mod: CRNA,,, | Performed by: NURSE ANESTHETIST, CERTIFIED REGISTERED

## 2019-05-31 PROCEDURE — 50543 PR LAP,PARTIAL NEPHRECTOMY: ICD-10-PCS | Mod: 22,LT,, | Performed by: UROLOGY

## 2019-05-31 RX ORDER — MIDAZOLAM HYDROCHLORIDE 1 MG/ML
INJECTION, SOLUTION INTRAMUSCULAR; INTRAVENOUS
Status: DISCONTINUED | OUTPATIENT
Start: 2019-05-31 | End: 2019-05-31

## 2019-05-31 RX ORDER — OXYCODONE HYDROCHLORIDE 5 MG/1
5 TABLET ORAL EVERY 4 HOURS PRN
Status: DISCONTINUED | OUTPATIENT
Start: 2019-05-31 | End: 2019-06-01 | Stop reason: HOSPADM

## 2019-05-31 RX ORDER — PANTOPRAZOLE SODIUM 40 MG/1
40 TABLET, DELAYED RELEASE ORAL NIGHTLY
Status: DISCONTINUED | OUTPATIENT
Start: 2019-05-31 | End: 2019-06-01 | Stop reason: HOSPADM

## 2019-05-31 RX ORDER — MIDAZOLAM HYDROCHLORIDE 1 MG/ML
0.5 INJECTION INTRAMUSCULAR; INTRAVENOUS
Status: DISCONTINUED | OUTPATIENT
Start: 2019-05-31 | End: 2019-05-31

## 2019-05-31 RX ORDER — FENTANYL CITRATE 50 UG/ML
25 INJECTION, SOLUTION INTRAMUSCULAR; INTRAVENOUS EVERY 5 MIN PRN
Status: DISCONTINUED | OUTPATIENT
Start: 2019-05-31 | End: 2019-05-31

## 2019-05-31 RX ORDER — ACETAMINOPHEN 500 MG
1000 TABLET ORAL
Status: COMPLETED | OUTPATIENT
Start: 2019-05-31 | End: 2019-05-31

## 2019-05-31 RX ORDER — DOCUSATE SODIUM 100 MG/1
100 CAPSULE, LIQUID FILLED ORAL 2 TIMES DAILY PRN
Status: DISCONTINUED | OUTPATIENT
Start: 2019-05-31 | End: 2019-06-01 | Stop reason: HOSPADM

## 2019-05-31 RX ORDER — OXYCODONE HYDROCHLORIDE 10 MG/1
10 TABLET ORAL EVERY 4 HOURS PRN
Status: DISCONTINUED | OUTPATIENT
Start: 2019-05-31 | End: 2019-06-01 | Stop reason: HOSPADM

## 2019-05-31 RX ORDER — LIDOCAINE HCL/PF 100 MG/5ML
SYRINGE (ML) INTRAVENOUS
Status: DISCONTINUED | OUTPATIENT
Start: 2019-05-31 | End: 2019-05-31

## 2019-05-31 RX ORDER — FAMOTIDINE 10 MG/ML
INJECTION INTRAVENOUS
Status: DISCONTINUED | OUTPATIENT
Start: 2019-05-31 | End: 2019-05-31

## 2019-05-31 RX ORDER — KETAMINE HYDROCHLORIDE 10 MG/ML
INJECTION, SOLUTION INTRAMUSCULAR; INTRAVENOUS
Status: DISCONTINUED | OUTPATIENT
Start: 2019-05-31 | End: 2019-05-31

## 2019-05-31 RX ORDER — HEPARIN SODIUM 5000 [USP'U]/ML
5000 INJECTION, SOLUTION INTRAVENOUS; SUBCUTANEOUS EVERY 8 HOURS
Status: DISCONTINUED | OUTPATIENT
Start: 2019-05-31 | End: 2019-06-01 | Stop reason: HOSPADM

## 2019-05-31 RX ORDER — CEFAZOLIN SODIUM 1 G/3ML
2 INJECTION, POWDER, FOR SOLUTION INTRAMUSCULAR; INTRAVENOUS
Status: COMPLETED | OUTPATIENT
Start: 2019-05-31 | End: 2019-05-31

## 2019-05-31 RX ORDER — ONDANSETRON 2 MG/ML
4 INJECTION INTRAMUSCULAR; INTRAVENOUS ONCE
Status: COMPLETED | OUTPATIENT
Start: 2019-05-31 | End: 2019-05-31

## 2019-05-31 RX ORDER — FENTANYL CITRATE 50 UG/ML
INJECTION, SOLUTION INTRAMUSCULAR; INTRAVENOUS
Status: DISCONTINUED | OUTPATIENT
Start: 2019-05-31 | End: 2019-05-31

## 2019-05-31 RX ORDER — HYDROMORPHONE HYDROCHLORIDE 1 MG/ML
0.2 INJECTION, SOLUTION INTRAMUSCULAR; INTRAVENOUS; SUBCUTANEOUS EVERY 5 MIN PRN
Status: DISCONTINUED | OUTPATIENT
Start: 2019-05-31 | End: 2019-05-31 | Stop reason: HOSPADM

## 2019-05-31 RX ORDER — ONDANSETRON 2 MG/ML
INJECTION INTRAMUSCULAR; INTRAVENOUS
Status: DISCONTINUED | OUTPATIENT
Start: 2019-05-31 | End: 2019-05-31

## 2019-05-31 RX ORDER — CELECOXIB 200 MG/1
400 CAPSULE ORAL
Status: COMPLETED | OUTPATIENT
Start: 2019-05-31 | End: 2019-05-31

## 2019-05-31 RX ORDER — DIPHENHYDRAMINE HCL 25 MG
25 CAPSULE ORAL EVERY 6 HOURS PRN
Status: DISCONTINUED | OUTPATIENT
Start: 2019-05-31 | End: 2019-06-01 | Stop reason: HOSPADM

## 2019-05-31 RX ORDER — GLYCOPYRROLATE 0.2 MG/ML
INJECTION INTRAMUSCULAR; INTRAVENOUS
Status: DISCONTINUED | OUTPATIENT
Start: 2019-05-31 | End: 2019-05-31

## 2019-05-31 RX ORDER — ONDANSETRON 2 MG/ML
4 INJECTION INTRAMUSCULAR; INTRAVENOUS EVERY 8 HOURS PRN
Status: DISCONTINUED | OUTPATIENT
Start: 2019-05-31 | End: 2019-06-01 | Stop reason: HOSPADM

## 2019-05-31 RX ORDER — METOCLOPRAMIDE HYDROCHLORIDE 5 MG/ML
INJECTION INTRAMUSCULAR; INTRAVENOUS
Status: DISCONTINUED | OUTPATIENT
Start: 2019-05-31 | End: 2019-05-31

## 2019-05-31 RX ORDER — POLYETHYLENE GLYCOL 3350 17 G/17G
17 POWDER, FOR SOLUTION ORAL DAILY
Status: DISCONTINUED | OUTPATIENT
Start: 2019-06-01 | End: 2019-06-01 | Stop reason: HOSPADM

## 2019-05-31 RX ORDER — ROCURONIUM BROMIDE 10 MG/ML
INJECTION, SOLUTION INTRAVENOUS
Status: DISCONTINUED | OUTPATIENT
Start: 2019-05-31 | End: 2019-05-31

## 2019-05-31 RX ORDER — HEPARIN SODIUM 5000 [USP'U]/ML
5000 INJECTION, SOLUTION INTRAVENOUS; SUBCUTANEOUS EVERY 8 HOURS
Status: DISCONTINUED | OUTPATIENT
Start: 2019-05-31 | End: 2019-05-31

## 2019-05-31 RX ORDER — SODIUM CHLORIDE 0.9 % (FLUSH) 0.9 %
10 SYRINGE (ML) INJECTION
Status: DISCONTINUED | OUTPATIENT
Start: 2019-05-31 | End: 2019-05-31 | Stop reason: HOSPADM

## 2019-05-31 RX ORDER — SODIUM CHLORIDE 9 MG/ML
INJECTION, SOLUTION INTRAVENOUS CONTINUOUS
Status: DISCONTINUED | OUTPATIENT
Start: 2019-05-31 | End: 2019-06-01 | Stop reason: HOSPADM

## 2019-05-31 RX ORDER — PROPOFOL 10 MG/ML
VIAL (ML) INTRAVENOUS
Status: DISCONTINUED | OUTPATIENT
Start: 2019-05-31 | End: 2019-05-31

## 2019-05-31 RX ORDER — PREGABALIN 50 MG/1
150 CAPSULE ORAL
Status: COMPLETED | OUTPATIENT
Start: 2019-05-31 | End: 2019-05-31

## 2019-05-31 RX ORDER — SODIUM CHLORIDE 9 MG/ML
INJECTION, SOLUTION INTRAVENOUS CONTINUOUS PRN
Status: DISCONTINUED | OUTPATIENT
Start: 2019-05-31 | End: 2019-05-31

## 2019-05-31 RX ORDER — ACETAMINOPHEN 500 MG
1000 TABLET ORAL EVERY 6 HOURS
Status: DISCONTINUED | OUTPATIENT
Start: 2019-05-31 | End: 2019-06-01 | Stop reason: HOSPADM

## 2019-05-31 RX ORDER — DEXAMETHASONE SODIUM PHOSPHATE 4 MG/ML
INJECTION, SOLUTION INTRA-ARTICULAR; INTRALESIONAL; INTRAMUSCULAR; INTRAVENOUS; SOFT TISSUE
Status: DISCONTINUED | OUTPATIENT
Start: 2019-05-31 | End: 2019-05-31

## 2019-05-31 RX ADMIN — KETAMINE HYDROCHLORIDE 10 MG: 10 INJECTION, SOLUTION INTRAMUSCULAR; INTRAVENOUS at 02:05

## 2019-05-31 RX ADMIN — MIDAZOLAM HYDROCHLORIDE 2 MG: 1 INJECTION, SOLUTION INTRAMUSCULAR; INTRAVENOUS at 11:05

## 2019-05-31 RX ADMIN — FENTANYL CITRATE 50 MCG: 50 INJECTION, SOLUTION INTRAMUSCULAR; INTRAVENOUS at 02:05

## 2019-05-31 RX ADMIN — LIDOCAINE HYDROCHLORIDE 75 MG: 20 INJECTION, SOLUTION INTRAVENOUS at 12:05

## 2019-05-31 RX ADMIN — ACETAMINOPHEN 1000 MG: 500 TABLET ORAL at 10:05

## 2019-05-31 RX ADMIN — GLYCOPYRROLATE 0.2 MG: 0.2 INJECTION, SOLUTION INTRAMUSCULAR; INTRAVENOUS at 01:05

## 2019-05-31 RX ADMIN — ROCURONIUM BROMIDE 10 MG: 10 INJECTION, SOLUTION INTRAVENOUS at 01:05

## 2019-05-31 RX ADMIN — SODIUM CHLORIDE, SODIUM GLUCONATE, SODIUM ACETATE, POTASSIUM CHLORIDE, MAGNESIUM CHLORIDE, SODIUM PHOSPHATE, DIBASIC, AND POTASSIUM PHOSPHATE: .53; .5; .37; .037; .03; .012; .00082 INJECTION, SOLUTION INTRAVENOUS at 01:05

## 2019-05-31 RX ADMIN — SODIUM CHLORIDE, SODIUM GLUCONATE, SODIUM ACETATE, POTASSIUM CHLORIDE, MAGNESIUM CHLORIDE, SODIUM PHOSPHATE, DIBASIC, AND POTASSIUM PHOSPHATE: .53; .5; .37; .037; .03; .012; .00082 INJECTION, SOLUTION INTRAVENOUS at 03:05

## 2019-05-31 RX ADMIN — ONDANSETRON 4 MG: 2 INJECTION INTRAMUSCULAR; INTRAVENOUS at 05:05

## 2019-05-31 RX ADMIN — PROMETHAZINE HYDROCHLORIDE 6.25 MG: 25 INJECTION INTRAMUSCULAR; INTRAVENOUS at 06:05

## 2019-05-31 RX ADMIN — OXYCODONE HYDROCHLORIDE 10 MG: 10 TABLET ORAL at 06:05

## 2019-05-31 RX ADMIN — FAMOTIDINE 20 MG: 10 INJECTION, SOLUTION INTRAVENOUS at 01:05

## 2019-05-31 RX ADMIN — HEPARIN SODIUM 5000 UNITS: 5000 INJECTION, SOLUTION INTRAVENOUS; SUBCUTANEOUS at 09:05

## 2019-05-31 RX ADMIN — PANTOPRAZOLE SODIUM 40 MG: 40 TABLET, DELAYED RELEASE ORAL at 09:05

## 2019-05-31 RX ADMIN — METOCLOPRAMIDE 10 MG: 5 INJECTION, SOLUTION INTRAMUSCULAR; INTRAVENOUS at 01:05

## 2019-05-31 RX ADMIN — CEFAZOLIN 2 G: 330 INJECTION, POWDER, FOR SOLUTION INTRAMUSCULAR; INTRAVENOUS at 01:05

## 2019-05-31 RX ADMIN — PROPOFOL 150 MG: 10 INJECTION, EMULSION INTRAVENOUS at 12:05

## 2019-05-31 RX ADMIN — HYDROMORPHONE HYDROCHLORIDE 0.2 MG: 1 INJECTION, SOLUTION INTRAMUSCULAR; INTRAVENOUS; SUBCUTANEOUS at 05:05

## 2019-05-31 RX ADMIN — SODIUM CHLORIDE: 0.9 INJECTION, SOLUTION INTRAVENOUS at 10:05

## 2019-05-31 RX ADMIN — ROCURONIUM BROMIDE 40 MG: 10 INJECTION, SOLUTION INTRAVENOUS at 12:05

## 2019-05-31 RX ADMIN — ONDANSETRON 4 MG: 2 INJECTION INTRAMUSCULAR; INTRAVENOUS at 11:05

## 2019-05-31 RX ADMIN — SODIUM CHLORIDE: 0.9 INJECTION, SOLUTION INTRAVENOUS at 05:05

## 2019-05-31 RX ADMIN — KETAMINE HYDROCHLORIDE 10 MG: 10 INJECTION, SOLUTION INTRAMUSCULAR; INTRAVENOUS at 01:05

## 2019-05-31 RX ADMIN — MIDAZOLAM HYDROCHLORIDE 2 MG: 1 INJECTION, SOLUTION INTRAMUSCULAR; INTRAVENOUS at 12:05

## 2019-05-31 RX ADMIN — FENTANYL CITRATE 50 MCG: 50 INJECTION, SOLUTION INTRAMUSCULAR; INTRAVENOUS at 01:05

## 2019-05-31 RX ADMIN — FENTANYL CITRATE 50 MCG: 50 INJECTION, SOLUTION INTRAMUSCULAR; INTRAVENOUS at 12:05

## 2019-05-31 RX ADMIN — PREGABALIN 150 MG: 150 CAPSULE ORAL at 10:05

## 2019-05-31 RX ADMIN — KETAMINE HYDROCHLORIDE 20 MG: 10 INJECTION, SOLUTION INTRAMUSCULAR; INTRAVENOUS at 01:05

## 2019-05-31 RX ADMIN — SUGAMMADEX 266 MG: 100 INJECTION, SOLUTION INTRAVENOUS at 04:05

## 2019-05-31 RX ADMIN — ROCURONIUM BROMIDE 20 MG: 10 INJECTION, SOLUTION INTRAVENOUS at 03:05

## 2019-05-31 RX ADMIN — ACETAMINOPHEN 1000 MG: 500 TABLET ORAL at 06:05

## 2019-05-31 RX ADMIN — CELECOXIB 400 MG: 200 CAPSULE ORAL at 10:05

## 2019-05-31 RX ADMIN — FENTANYL CITRATE 50 MCG: 50 INJECTION, SOLUTION INTRAMUSCULAR; INTRAVENOUS at 03:05

## 2019-05-31 RX ADMIN — ROCURONIUM BROMIDE 30 MG: 10 INJECTION, SOLUTION INTRAVENOUS at 01:05

## 2019-05-31 RX ADMIN — SODIUM CHLORIDE: 0.9 INJECTION, SOLUTION INTRAVENOUS at 12:05

## 2019-05-31 RX ADMIN — FENTANYL CITRATE 50 MCG: 50 INJECTION INTRAMUSCULAR; INTRAVENOUS at 11:05

## 2019-05-31 RX ADMIN — HEPARIN SODIUM 5000 UNITS: 5000 INJECTION, SOLUTION INTRAVENOUS; SUBCUTANEOUS at 11:05

## 2019-05-31 RX ADMIN — ONDANSETRON 4 MG: 2 INJECTION INTRAMUSCULAR; INTRAVENOUS at 04:05

## 2019-05-31 RX ADMIN — DEXAMETHASONE SODIUM PHOSPHATE 4 MG: 4 INJECTION, SOLUTION INTRAMUSCULAR; INTRAVENOUS at 01:05

## 2019-05-31 NOTE — ANESTHESIA POSTPROCEDURE EVALUATION
Anesthesia Post Evaluation    Patient: Betty Faustin    Procedure(s) Performed: Procedure(s) (LRB):  XI ROBOTIC NEPHRECTOMY, PARTIAL (Left)    Final Anesthesia Type: general  Patient location during evaluation: PACU  Patient participation: Yes- Able to Participate  Level of consciousness: awake and alert  Post-procedure vital signs: reviewed and stable  Pain management: adequate  Airway patency: patent  PONV status at discharge: nausea (controlled)  Anesthetic complications: no      Cardiovascular status: stable  Respiratory status: unassisted and spontaneous ventilation  Hydration status: euvolemic  Follow-up not needed.          Vitals Value Taken Time   /71 5/31/2019  6:16 PM   Temp 36.9 °C (98.5 °F) 5/31/2019  6:00 PM   Pulse 84 5/31/2019  6:18 PM   Resp 15 5/31/2019  6:18 PM   SpO2 95 % 5/31/2019  6:18 PM   Vitals shown include unvalidated device data.      No case tracking events are documented in the log.      Pain/Anselmo Score: Pain Rating Prior to Med Admin: 6 (5/31/2019  5:57 PM)  Pain Rating Post Med Admin: 0 (5/31/2019 12:00 PM)  Anselmo Score: 6 (5/31/2019  5:12 PM)

## 2019-05-31 NOTE — INTERVAL H&P NOTE
The patient has been examined and the H&P has been reviewed:    I concur with the findings and no changes have occurred since H&P was written.    Anesthesia/Surgery risks, benefits and alternative options discussed and understood by patient/family.          Active Hospital Problems    Diagnosis  POA    Renal mass [N28.89]  Yes      Resolved Hospital Problems   No resolved problems to display.

## 2019-05-31 NOTE — OP NOTE
Certification of Assistant at Surgery       Surgery Date: 5/31/2019     Participating Surgeons:  Surgeon(s) and Role:     * Srinath Killian MD - Primary     * May Suh MD - Resident - Assisting     * Vinita Lopez MD - Resident - Assisting    Procedures:  Procedure(s) (LRB):  XI ROBOTIC NEPHRECTOMY, PARTIAL (Left)    Assistant Surgeon's Certification of Necessity:  I understand that section 1842 (b) (6) (d) of the Social Security Act generally prohibits Medicare Part B reasonable charge payment for the services of assistants at surgery in teaching hospitals when qualified residents are available to furnish such services. I certify that the services for which payment is claimed were medically necessary, and that no qualified resident was available to perform the services. I further understand that these services are subject to post-payment review by the Medicare carrier.      Emely Daly PA-C    05/31/2019  5:04 PM

## 2019-05-31 NOTE — TRANSFER OF CARE
"Anesthesia Transfer of Care Note    Patient: Betty Faustin    Procedure(s) Performed: Procedure(s) (LRB):  XI ROBOTIC NEPHRECTOMY, PARTIAL (Left)    Patient location: PACU    Anesthesia Type: general    Transport from OR: Transported from OR on 6-10 L/min O2 by face mask with adequate spontaneous ventilation    Post pain: adequate analgesia    Post assessment: no apparent anesthetic complications and tolerated procedure well    Post vital signs: stable    Level of consciousness: sedated    Nausea/Vomiting: no nausea/vomiting    Complications: none    Transfer of care protocol was followed      Last vitals:   Visit Vitals  /69 (BP Location: Left arm, Patient Position: Lying)   Pulse 75   Temp 36.7 °C (98.1 °F) (Oral)   Resp 18   Ht 5' 11" (1.803 m)   Wt 132.9 kg (293 lb)   SpO2 97%   Breastfeeding? No   BMI 40.87 kg/m²     "

## 2019-05-31 NOTE — ANESTHESIA PROCEDURE NOTES
Arterial    Diagnosis: renal mass    Patient location during procedure: done in OR  Procedure start time: 5/31/2019 1:26 PM  Timeout: 5/31/2019 1:26 PM  Procedure end time: 5/31/2019 1:28 PM  Staffing  Anesthesiologist: Elsi Veliz MD  Performed: anesthesiologist   Anesthesiologist was present at the time of the procedure.  Preanesthetic Checklist  Completed: patient identified, site marked, surgical consent, pre-op evaluation, timeout performed, IV checked, risks and benefits discussed, monitors and equipment checked and anesthesia consent givenArterial  Skin Prep: chlorhexidine gluconate  Local Infiltration: none  Orientation: right  Location: radial  Catheter Size: 20 G  Catheter placement by Anatomical landmarks. Heme positive aspiration all ports.Insertion Attempts: 1  Assessment  Dressing: secured with tape and tegaderm

## 2019-05-31 NOTE — OP NOTE
[facility] Operative Note    [facility]  Operative Note    Date: 05/31/2019    Pre-Op Diagnosis: left renal mass suspicious for renal cell carcinoma    Post-Op Diagnosis: same    Procedure(s) Performed:   Robotic left partial nephrectomy  INtraoperative ultrasound localizatioon    Specimen(s): left renal mass    Staff Surgeon: Srinath Killian MD    Assistant Surgeon: May Suh MD; Vinita Lopez MD    Bedside Assistant: Emely Daly PA-C (no qualified resident available for bedside assistance).    Anesthesia: General endotracheal anesthesia    Indications: Betty Faustin is a 38 y.o. female with a left renal mass.    Findings:   -very difficult hilar anatomy with 2 arteries and 2 veins, with the main vein branching to 3 early segmental veins  -Warm ischemia time 22 minutes, 40seconds    -MODIFIER 22--Due to the location of the tumor, complete mobilization of the kidney, and the complex hilar anatomy, the partial nephrectomy took approximately 100% more time than is typical of the procedure.     Estimated Blood Loss: 50cc    Drains: tima drain, parks catheter      CLINICAL HISTORY: The patient was recently noted to have a left renal tumor. The patient was consented to a robotic-assisted partial nephrectomy with possible radical nephrectomy.     OPERATIVE PROCEDURE:The patient was brought to the operating room, and after identification by name and number, was placed supine on the operating table. General endotracheal anesthesia was administered, and a Parks catheter was placed, and clear yellow urine was left to gravity drainage. The patient was then moved into the modified right lateral decubitus position in order to allow access to the left flank. The patient was prepped and draped in the usual sterile manner. A Veress needle was used to obtain entry into the peritoneum, and the peritoneal cavity was insufflated without difficulty. Four robotic ports were then placed in a straight  line configuration.  A 12 mm assistant port was placed in the midline below the third robotic arm.     Dissection was begun by reflecting the colon medially. The gonadal vein was then  away from the lateral fascial tissue, and the ureter was identified. It was followed up into the renal pelvis. The renal hilum was visualized and dissected from its fascia. 2 arteries and 2 veins were noted.     The tumor location required complete mobilization of the kidney and the kidney was flipped entirely.    The tumor was noted to be minimally exophytic.Intraoperative ultrasound confirmed that the lower pole mass was the area indicated for resection. The edges of the mass were cauterized for identification. A bulldog clamp was then placed across the lower pole artery followed by the main renal artery. Resection of the mass was performed with sharp dissection. The collecting system was entered during the resection.     Once the resection was completed, the tumor was placed in an Endo Catch bag. Adequate hemostasis was noted.. A renorrhaphy was performed in the standard fashion with a 2-0 vicryl along the inner bed. Double armed 0 vicryls were used to perform the corticomedullary sutures using the sliding hem-o-lock technique. The bulldog clamp was removed after 22 minutes.     The kidney was noted to have good turgor and color. The overlying gerotas fascia and fat was reapproximated over the kidney. The ureter was noted be well away from the site. Removal of the tumor was then accomplished by removing the endocatch bag directly through the assistant port in the midline. It was sent to pathology for further analysis.      The incisions were closed at the skin level with subcuticular 4-0 monocryl suture.    Due to the location of the tumor, complete mobilization of the kidney, and the complex hilar anatomy, the partial nephrectomy took approximately 100% more time than is typical of the procedure.     The patient was  cleaned and dried, and dressings were applied. The patient was returned to the supine position and emerged from general anesthesia without incident. The patient was taken to the PACU in stable condition.    Disposition: The patient will remain on the urology service overnight for observation.

## 2019-06-01 VITALS
BODY MASS INDEX: 41.02 KG/M2 | WEIGHT: 293 LBS | TEMPERATURE: 99 F | HEIGHT: 71 IN | DIASTOLIC BLOOD PRESSURE: 70 MMHG | HEART RATE: 86 BPM | OXYGEN SATURATION: 96 % | RESPIRATION RATE: 16 BRPM | SYSTOLIC BLOOD PRESSURE: 118 MMHG

## 2019-06-01 LAB
ANION GAP SERPL CALC-SCNC: 5 MMOL/L (ref 8–16)
BASOPHILS # BLD AUTO: 0.02 K/UL (ref 0–0.2)
BASOPHILS NFR BLD: 0.2 % (ref 0–1.9)
BUN SERPL-MCNC: 19 MG/DL (ref 6–20)
CALCIUM SERPL-MCNC: 8.2 MG/DL (ref 8.7–10.5)
CHLORIDE SERPL-SCNC: 110 MMOL/L (ref 95–110)
CO2 SERPL-SCNC: 23 MMOL/L (ref 23–29)
CREAT SERPL-MCNC: 1 MG/DL (ref 0.5–1.4)
DIFFERENTIAL METHOD: ABNORMAL
EOSINOPHIL # BLD AUTO: 0 K/UL (ref 0–0.5)
EOSINOPHIL NFR BLD: 0.2 % (ref 0–8)
ERYTHROCYTE [DISTWIDTH] IN BLOOD BY AUTOMATED COUNT: 14.4 % (ref 11.5–14.5)
EST. GFR  (AFRICAN AMERICAN): >60 ML/MIN/1.73 M^2
EST. GFR  (NON AFRICAN AMERICAN): >60 ML/MIN/1.73 M^2
GLUCOSE SERPL-MCNC: 101 MG/DL (ref 70–110)
HCT VFR BLD AUTO: 33.5 % (ref 37–48.5)
HGB BLD-MCNC: 10.7 G/DL (ref 12–16)
IMM GRANULOCYTES # BLD AUTO: 0.04 K/UL (ref 0–0.04)
IMM GRANULOCYTES NFR BLD AUTO: 0.4 % (ref 0–0.5)
LYMPHOCYTES # BLD AUTO: 1.1 K/UL (ref 1–4.8)
LYMPHOCYTES NFR BLD: 10.2 % (ref 18–48)
MCH RBC QN AUTO: 27 PG (ref 27–31)
MCHC RBC AUTO-ENTMCNC: 31.9 G/DL (ref 32–36)
MCV RBC AUTO: 85 FL (ref 82–98)
MONOCYTES # BLD AUTO: 0.8 K/UL (ref 0.3–1)
MONOCYTES NFR BLD: 7.3 % (ref 4–15)
NEUTROPHILS # BLD AUTO: 8.9 K/UL (ref 1.8–7.7)
NEUTROPHILS NFR BLD: 81.7 % (ref 38–73)
NRBC BLD-RTO: 0 /100 WBC
PLATELET # BLD AUTO: 414 K/UL (ref 150–350)
PMV BLD AUTO: 9.8 FL (ref 9.2–12.9)
POTASSIUM SERPL-SCNC: 4.2 MMOL/L (ref 3.5–5.1)
RBC # BLD AUTO: 3.96 M/UL (ref 4–5.4)
SODIUM SERPL-SCNC: 138 MMOL/L (ref 136–145)
WBC # BLD AUTO: 10.82 K/UL (ref 3.9–12.7)

## 2019-06-01 PROCEDURE — 36415 COLL VENOUS BLD VENIPUNCTURE: CPT

## 2019-06-01 PROCEDURE — 63600175 PHARM REV CODE 636 W HCPCS: Performed by: UROLOGY

## 2019-06-01 PROCEDURE — 85025 COMPLETE CBC W/AUTO DIFF WBC: CPT

## 2019-06-01 PROCEDURE — 25000003 PHARM REV CODE 250: Performed by: UROLOGY

## 2019-06-01 PROCEDURE — 80048 BASIC METABOLIC PNL TOTAL CA: CPT

## 2019-06-01 RX ORDER — OXYCODONE HYDROCHLORIDE 5 MG/1
5 TABLET ORAL EVERY 6 HOURS PRN
Qty: 10 TABLET | Refills: 0 | Status: SHIPPED | OUTPATIENT
Start: 2019-06-01 | End: 2021-03-15

## 2019-06-01 RX ORDER — POLYETHYLENE GLYCOL 3350 17 G/17G
17 POWDER, FOR SOLUTION ORAL DAILY
Qty: 1 BOTTLE | Refills: 0 | COMMUNITY
Start: 2019-06-01 | End: 2021-03-15

## 2019-06-01 RX ADMIN — ACETAMINOPHEN 1000 MG: 500 TABLET ORAL at 12:06

## 2019-06-01 RX ADMIN — HEPARIN SODIUM 5000 UNITS: 5000 INJECTION, SOLUTION INTRAVENOUS; SUBCUTANEOUS at 05:06

## 2019-06-01 RX ADMIN — OXYCODONE HYDROCHLORIDE 10 MG: 10 TABLET ORAL at 12:06

## 2019-06-01 RX ADMIN — POLYETHYLENE GLYCOL 3350 17 G: 17 POWDER, FOR SOLUTION ORAL at 09:06

## 2019-06-01 RX ADMIN — ACETAMINOPHEN 1000 MG: 500 TABLET ORAL at 05:06

## 2019-06-01 RX ADMIN — SODIUM CHLORIDE: 0.9 INJECTION, SOLUTION INTRAVENOUS at 12:06

## 2019-06-01 NOTE — ASSESSMENT & PLAN NOTE
37 yo female s/p partial nephrectomy POD#1 .    - parks removed this am   - LINUS drain removed this am   - will discharge patient home once she is ambulating well, tolerating diet, pain well controlled   - voiding trial   - follow up as outpatient with Dr. Killian.

## 2019-06-01 NOTE — DISCHARGE SUMMARY
[facility]  Urology  Discharge Summary      Patient Name: Betty Faustin  MRN: [id number]  Admission Date: 5/31/2019  Hospital Length of Stay: 1 days  Discharge Date and Time: 6/1/2019  2:30 PM  Attending Physician: Srinath Killian MD   Discharging Provider: Philippe Mccoy MD  Primary Care Physician: Primary Doctor No    HPI:   37 yo female with left renal mass s/p partial nephrectomy POD#1     Procedure(s) (LRB):  XI ROBOTIC NEPHRECTOMY, PARTIAL (Left)     Indwelling Lines/Drains at time of discharge:   Lines/Drains/Airways          None          Hospital Course (synopsis of major diagnoses, care, treatment, and services provided during the course of the hospital stay):  Patient presented to [facility] and underwent above procedure. Tolerated the procedure well and was transferred to the floor after recovery from anesthesia. Please see the operative note for further procedure details. Vitals remained stable throughout the post operative period. Physical exam was appropriate for post operative state. The parks was removed on POD1 and the patient was able to void without difficulty. Diet was advanced, and the patient was able to tolerate a regular diet prior to discharge. Patient was ambulating on POD1 without issues. LINUS drained was removed prior to discharge. Patient was deemed suitable for discharge on 6/1/2019  2:30 PM.      Medications and instructions as below.  For more thorough information, please refer to the hospital record and operative report.    Consults:     Significant Diagnostic Studies:     Pending Diagnostic Studies:     None          Final Active Diagnoses:    Diagnosis Date Noted POA    PRINCIPAL PROBLEM:  Renal mass [N28.89] 05/30/2019 Yes    Acid reflux [K21.9] 05/30/2019 Yes    Morbid obesity [E66.01] 05/30/2019 Yes    Essential hypertension [I10] 05/30/2019 Yes    Elevated platelet count [R79.89] 05/30/2019 Yes      Problems Resolved During this Admission:       Discharged  Condition: good    Disposition: Home or Self Care    Follow Up:  Follow-up Information     Srinath Killian MD On 6/24/2019.    Specialty:  Urology  Why:  Post-op follow up appointment  Contact information:  [address]  [facility] [address] [phone]                 Patient Instructions:      Lifting restrictions   Order Comments: Do not drive while taking pain medications. No strenuous activity or lifting greater than 10 pounds for 4 weeks.     Notify your health care provider if you experience any of the following:  temperature >100.4     Notify your health care provider if you experience any of the following:  persistent nausea and vomiting or diarrhea     Notify your health care provider if you experience any of the following:  severe uncontrolled pain     Notify your health care provider if you experience any of the following:  redness, tenderness, or signs of infection (pain, swelling, redness, odor or green/yellow discharge around incision site)     Notify your health care provider if you experience any of the following:  difficulty breathing or increased cough     Notify your health care provider if you experience any of the following:  severe persistent headache     Notify your health care provider if you experience any of the following:  worsening rash     Notify your health care provider if you experience any of the following:  persistent dizziness, light-headedness, or visual disturbances     Notify your health care provider if you experience any of the following:  increased confusion or weakness     No dressing needed   Order Comments: Do not soak incisions in tub or bath and do not scrub incisions. You may shower over incisions. If you have surgical glue in place, the glue will come off over the next few weeks.     Medications:  Reconciled Home Medications:      Medication List      START taking these medications    oxyCODONE 5 MG immediate release tablet  Commonly known as:   ROXICODONE  Take 1 tablet (5 mg total) by mouth every 6 (six) hours as needed for Pain.     polyethylene glycol 17 gram/dose powder  Commonly known as:  GLYCOLAX  Take 17 g by mouth once daily.        CONTINUE taking these medications    pantoprazole 40 MG tablet  Commonly known as:  PROTONIX  Take 40 mg by mouth every evening.            Time spent on the discharge of patient: 15 minutes    Philippe Mccoy MD  Urology  [facility]

## 2019-06-01 NOTE — PROGRESS NOTES
Pt ready for discharge. Discharge instructions given and explained to pt. Pt verbalizes understanding. PIV's removed. No further questions from pt at this time. Pt to be discharged via wheelchair once she receives prescription from [facility] pharmacy. Will cont to monitor until discharge.

## 2019-06-01 NOTE — SUBJECTIVE & OBJECTIVE
Interval History:   One episode of emesis overnight.   Tolerating clears since then.    Ambulating.     Review of Systems  Objective:     Temp:  [97.6 °F (36.4 °C)-98.6 °F (37 °C)] 97.6 °F (36.4 °C)  Pulse:  [67-98] 82  Resp:  [10-24] 16  SpO2:  [94 %-100 %] 97 %  BP: (100-151)/(58-97) 100/59     Body mass index is 40.87 kg/m².           Drains     Drain                 Closed/Suction Drain 05/31/19 1625 Left Abdomen Bulb 15 Fr. less than 1 day         Urethral Catheter 05/31/19 1302 Straight-tip;Non-latex 16 Fr. less than 1 day                Physical Exam   Constitutional: She is oriented to person, place, and time. She appears well-developed.   HENT:   Head: Normocephalic and atraumatic.   Neck: Normal range of motion.   Cardiovascular: Normal rate.    Pulmonary/Chest: Effort normal.   Abdominal:   Incisions c/d/i. Appropriately tender to palpation. Non distended non tender.    Musculoskeletal: Normal range of motion.   Neurological: She is alert and oriented to person, place, and time.       Significant Labs:    BMP:  Recent Labs   Lab 05/30/19  0953 05/31/19  1723 06/01/19  0707    137 138   K 4.0 4.3 4.2    109 110   CO2 27 20* 23   BUN 18 18 19   CREATININE 0.9 1.0 1.0   CALCIUM 9.6 8.5* 8.2*       CBC:   Recent Labs   Lab 05/30/19  0953 05/31/19  1723 06/01/19  0707   WBC 5.91 13.60* 10.82   HGB 12.2 11.9* 10.7*   HCT 38.7 37.4 33.5*   * 417* 414*       All pertinent labs results from the past 24 hours have been reviewed.    Significant Imaging:  All pertinent imaging results/findings from the past 24 hours have been reviewed.

## 2019-06-01 NOTE — PROGRESS NOTES
[facility]  Urology  Progress Note    Patient Name: Betty Faustin  MRN: [id number]  Admission Date: 5/31/2019  Hospital Length of Stay: 1 days  Code Status: Prior   Attending Provider: Srinath Killian MD   Primary Care Physician: Primary Doctor No    Subjective:     HPI:  39 yo female with left renal mass s/p partial nephrectomy POD#1     Interval History:   One episode of emesis overnight.   Tolerating clears since then.    Ambulating.     Review of Systems  Objective:     Temp:  [97.6 °F (36.4 °C)-98.6 °F (37 °C)] 97.6 °F (36.4 °C)  Pulse:  [67-98] 82  Resp:  [10-24] 16  SpO2:  [94 %-100 %] 97 %  BP: (100-151)/(58-97) 100/59     Body mass index is 40.87 kg/m².           Drains     Drain                 Closed/Suction Drain 05/31/19 1625 Left Abdomen Bulb 15 Fr. less than 1 day         Urethral Catheter 05/31/19 1302 Straight-tip;Non-latex 16 Fr. less than 1 day                Physical Exam   Constitutional: She is oriented to person, place, and time. She appears well-developed.   HENT:   Head: Normocephalic and atraumatic.   Neck: Normal range of motion.   Cardiovascular: Normal rate.    Pulmonary/Chest: Effort normal.   Abdominal:   Incisions c/d/i. Appropriately tender to palpation. Non distended non tender.    Musculoskeletal: Normal range of motion.   Neurological: She is alert and oriented to person, place, and time.       Significant Labs:    BMP:  Recent Labs   Lab 05/30/19  0953 05/31/19  1723 06/01/19  0707    137 138   K 4.0 4.3 4.2    109 110   CO2 27 20* 23   BUN 18 18 19   CREATININE 0.9 1.0 1.0   CALCIUM 9.6 8.5* 8.2*       CBC:   Recent Labs   Lab 05/30/19  0953 05/31/19  1723 06/01/19  0707   WBC 5.91 13.60* 10.82   HGB 12.2 11.9* 10.7*   HCT 38.7 37.4 33.5*   * 417* 414*       All pertinent labs results from the past 24 hours have been reviewed.    Significant Imaging:  All pertinent imaging results/findings from the past 24 hours have been  reviewed.                  Assessment/Plan:     * Renal mass  39 yo female s/p partial nephrectomy POD#1 .    - parks removed this am   - LINUS drain removed this am   - will discharge patient home once she is ambulating well, tolerating diet, pain well controlled   - voiding trial   - follow up as outpatient with Dr. Killian.         VTE Risk Mitigation (From admission, onward)        Ordered     heparin (porcine) injection 5,000 Units  Every 8 hours      05/31/19 1707     Place sequential compression device  Until discontinued      05/31/19 1707          Fred Molina MD  Urology  [facility]    Patient seen at the bedside.  Agree with the above note.

## 2019-06-01 NOTE — NURSING TRANSFER
Nursing Transfer Note      5/31/2019     Transfer 502A    Transfer via stretcher    Transfer with IVF    Transported by PCT    Medicines sent: none    Chart send with patient: YES     Notified: spouse    Patient reassessed at: 1900 05/31/19

## 2019-06-01 NOTE — PLAN OF CARE
Problem: Adult Inpatient Plan of Care  Goal: Plan of Care Review  Outcome: Ongoing (interventions implemented as appropriate)  Pt AAOx4. VS as charted. Q2 rounding for pt care and safety. Pain controlled with PRN medication. Pt ambulated in halls, no distress noted -no falls/injury this shift. Pt denies any NV. x5 lap sites intact with dermabond, LINUS drain to suction intact with bloody drainage. Kelley catheter intact, draining clear yellow urine. SCD in place. Safety precautions maintained - bed in low position, call light in reach, side rails up x2.

## 2019-06-01 NOTE — NURSING
Report received. Care assumed. Patient arrived via stretcher oriented x4, still sleepy/drowsy but easily arousable.  VSS. Pt lying supine in bed, with HOB at 30 - bed locked to keep HOB at 30 or higher. PIV intact with NS infusing. x5 lap sites with dermabond noted. LINUS drain intact and to suction with bloody drainage. Kelley catheter intact draining yellow urine.Pain and nausea management discussed with patient. Education on IS given, with return demonstration. Will attempt to ambulate pt later this evening as pt is still drowsy - pt agreed.  and children at bedside. Patient oriented to room and call light system. .

## 2019-06-02 ENCOUNTER — TELEPHONE (OUTPATIENT)
Dept: UROLOGY | Facility: HOSPITAL | Age: 39
End: 2019-06-02

## 2019-06-03 NOTE — TELEPHONE ENCOUNTER
Patient called with fever to 101.8. She says her abdominal pain is worse than today than yesterday. She is still voiding well without issues. No other symptoms, no nausea/vomiting, no dysuria. No weakness. I instructed her to continue ambulating multiple times per day and work on deep breathing. She was told to go to the ED if she had worsening weakness, lightheadedness, with associated persistent fevers.

## 2019-06-10 NOTE — PHYSICIAN QUERY
PT Name: Betty Faustin  MR #: [id number]     Physician Query Form - Documentation Clarification      CDS/: Angela Fitzpatrick RN                 Contact information:[email or web address]    This form is a permanent document in the medical record.     Query Date: Yani 10, 2019    By submitting this query, we are merely seeking further clarification of documentation. Please utilize your independent clinical judgment when addressing the question(s) below.    The Medical record reflects the following:    Supporting Clinical Findings Location in Medical Record   The tumor location required complete mobilization of the kidney and the kidney was flipped entirely.     The tumor was noted to be minimally exophytic.Intraoperative ultrasound confirmed that the lower pole mass was the area indicated for resection. The edges of the mass were cauterized for identification. A bulldog clamp was then placed across the lower pole artery followed by the main renal artery. Resection of the mass was performed with sharp dissection. The collecting system was entered during the resection.     Once the resection was completed, the tumor was placed in an Endo Catch bag. Adequate hemostasis was noted.. A renorrhaphy was performed in the standard fashion with a 2-0 vicryl along the inner bed. Double armed 0 vicryls were used to perform the corticomedullary sutures using the sliding hem-o-lock technique. The bulldog clamp was removed after 22 minutes.      The kidney was noted to have good turgor and color. The overlying gerotas fascia and fat was reapproximated over the kidney. The ureter was noted be well away from the site. Removal of the tumor was then accomplished by removing the endocatch bag directly through the assistant port in the midline. It was sent to pathology for further analysis OP note 5/31                                                                                      Doctor, Please specify the renal tumor location  associated with above clinical findings. Please check all that apply.    Provider Use Only      ___renal tumor located in calyx    ___renal tumor located in hilux    ___renal tumor located in pelvis    __x_other_____cortex of the kidney, but endophytic and located in close proximity to the calyx. _________________________                                                                                                                         [  ] Clinically Undetermined

## 2019-06-10 NOTE — PHYSICIAN QUERY
PT Name: Betty Faustin  MR #: [id number]    Physician Query Form - Pathology Findings Clarification     CDS/: Angela Fitzpatrick RN                 Contact information:[email or web address]  This form is a permanent document in the medical record.     Query Date: Yani 10, 2019      By submitting this query, we are merely seeking further clarification of documentation.  Please utilize your independent clinical judgment when addressing the question(s) below.      The medical record contains the following:     Findings Supporting Clinical Information Location in Medical Record   Left kidney Clear cell renal cell carcinoma FINAL PATHOLOGIC DIAGNOSIS  Left kidney, tumor, partial nephrectomy:  Clear cell renal cell carcinoma, measuring 2.2 cm in greatest dimension.  Synoptic comment.  Comment: Synoptic report  Specimen: Left kidney  Specimen laterality: Left  Procedure: Partial nephrectomy  Tumor size: Greatest dimension: 2.2 cm  Tumor focality: Unifocal  Histologic type: Clear cell renal cell carcinoma  Sarcomatoid features: Not identified  Rhabdoid features: Not identified  Histologic grade: Viji nuclear grade  G2: Nucleoli conspicuous and eosinophilic at 400x magnification, visible but not prominent at 100x magnification  Tumor necrosis: Not identified  Anatomic extent of tumor: Tumor limited to the kidney  Margins: Uninvolved by invasive carcinoma  Regional Lymph Nodes: No lymph nodes submitted or found  Pathologic staging: Primary tumor: pT1a: Tumor greater than or equal to 4 cm in greatest dimension, limited to the kidney  Regional lymph nodes:pNX: Regional lymph nodes cannot be assessed  Distant metastasis: pMX: Cannot be assessed.  Pathologic findings in nonneoplastic kidney: Not identified. Path report 5/31     Please document the clinical significance of the Pathologists findings of Left kidney Clear cell renal cell carcinom__.    [ x  ] I agree with the Pathology Findings   [   ] I do not agree with the  Pathology Findings   [   ] Other/Clarification of Findings:   [   ] Clinically Insignificant   [  ] Clinically Undetermined       Please document in your progress notes daily for the duration of treatment until resolved and include in your discharge summary.

## 2019-06-25 ENCOUNTER — OFFICE VISIT (OUTPATIENT)
Dept: UROLOGY | Facility: CLINIC | Age: 39
End: 2019-06-25
Payer: COMMERCIAL

## 2019-06-25 VITALS
WEIGHT: 290.56 LBS | DIASTOLIC BLOOD PRESSURE: 83 MMHG | HEART RATE: 68 BPM | BODY MASS INDEX: 40.53 KG/M2 | SYSTOLIC BLOOD PRESSURE: 142 MMHG

## 2019-06-25 DIAGNOSIS — C64.2 RENAL CELL CARCINOMA OF LEFT KIDNEY: Primary | ICD-10-CM

## 2019-06-25 PROCEDURE — 99024 POSTOP FOLLOW-UP VISIT: CPT | Mod: S$GLB,,, | Performed by: UROLOGY

## 2019-06-25 PROCEDURE — 99999 PR PBB SHADOW E&M-EST. PATIENT-LVL III: CPT | Mod: PBBFAC,,, | Performed by: UROLOGY

## 2019-06-25 PROCEDURE — 99999 PR PBB SHADOW E&M-EST. PATIENT-LVL III: ICD-10-PCS | Mod: PBBFAC,,, | Performed by: UROLOGY

## 2019-06-25 PROCEDURE — 99024 PR POST-OP FOLLOW-UP VISIT: ICD-10-PCS | Mod: S$GLB,,, | Performed by: UROLOGY

## 2019-06-25 NOTE — PROGRESS NOTES
Subjective:       Patient ID: Betty Faustin is a 38 y.o. female.    Chief Complaint:  Follow-up (PO)      History of Present Illness  HPI  Patient is a 38 y.o. female who is established to our clinic and referred by their urologist, Dr. Villarreal for evaluation of a left renal mass.   She developed diarrhea and GI upset which prompted abdominal imaging.  She had an incidental finding of a left renal mass.    Underwent a left robotic partial nephrectomy 5/31/19.  Uneventful.  Was admitted to outside facility with pain.  Had a CT scan (brought report)---showed post-op changes, nothing concerning.     No gross hematuria currently.       PATHOLOGY:  SPECIMEN  1) Left kidney tumor.  FINAL PATHOLOGIC DIAGNOSIS  Left kidney, tumor, partial nephrectomy:  Clear cell renal cell carcinoma, measuring 2.2 cm in greatest dimension.  Synoptic comment.  Comment: Synoptic report  Specimen: Left kidney  Specimen laterality: Left  Procedure: Partial nephrectomy  Tumor size:  Greatest dimension: 2.2 cm  Tumor focality: Unifocal  Histologic type: Clear cell renal cell carcinoma  Sarcomatoid features: Not identified  Rhabdoid features: Not identified  Histologic grade: Viji nuclear grade  G2: Nucleoli conspicuous and eosinophilic at 400x magnification, visible but not prominent at 100x magnification  Tumor necrosis: Not identified  Anatomic extent of tumor: Tumor limited to the kidney  Margins: Uninvolved by invasive carcinoma  Regional Lymph Nodes: No lymph nodes submitted or found  Pathologic staging:  Primary tumor:  pT1a: Tumor greater than or equal to 4 cm in greatest dimension, limited to the kidney  Regional lymph nodes:  pNX: Regional lymph nodes cannot be assessed  Distant metastasis:  pMX: Cannot be assessed.  Pathologic findings in nonneoplastic kidney: Not identified.    Review of Systems  Review of Systems  All other systems reviewed and negative except pertinent positives noted in HPI.       Objective:     Physical Exam    Constitutional: She is oriented to person, place, and time. She appears well-developed and well-nourished. She is cooperative.  Non-toxic appearance.   HENT:   Head: Normocephalic.   Cardiovascular: Normal rate, intact distal pulses and normal pulses.    Pulmonary/Chest: Effort normal. No accessory muscle usage. No tachypnea. No respiratory distress.   Abdominal: Soft. Normal appearance. She exhibits no distension, no fluid wave, no ascites and no mass. There is no tenderness. There is no rebound and no CVA tenderness. No hernia.       Liver/spleen non-palpable   Musculoskeletal: Normal range of motion.   Neurological: She is alert and oriented to person, place, and time. She has normal strength.   Skin: No bruising and no rash noted.     Psychiatric: She has a normal mood and affect. Her speech is normal and behavior is normal. Thought content normal.       Lab Review  Lab Results   Component Value Date    COLORU Yellow 05/07/2019    SPECGRAV 1.025 05/07/2019    PHUR 6.0 05/07/2019    NITRITE Negative 05/07/2019    KETONESU Negative 05/07/2019         Assessment:        1. Renal cell carcinoma of left kidney            Plan:     Renal cell carcinoma of left kidney  -     Basic metabolic panel; Future; Expected date: 03/25/2020  -     CT Abdomen With Without Contrast; Future; Expected date: 03/25/2020  -     X-Ray Chest PA And Lateral; Future; Expected date: 03/25/2020    -labs and imaging in 9 months---likely yearly after that up to 3 years at least.  -genetic counseling ordered  -follow-up after imaging.  Patient to notify us of any complications or problems following surgery.

## 2020-03-16 ENCOUNTER — HOSPITAL ENCOUNTER (OUTPATIENT)
Dept: RADIOLOGY | Facility: HOSPITAL | Age: 40
Discharge: HOME | End: 2020-03-16
Attending: UROLOGY
Payer: COMMERCIAL

## 2020-03-16 ENCOUNTER — LAB VISIT (OUTPATIENT)
Dept: LAB | Facility: HOSPITAL | Age: 40
End: 2020-03-16
Attending: UROLOGY
Payer: COMMERCIAL

## 2020-03-16 ENCOUNTER — OFFICE VISIT (OUTPATIENT)
Dept: UROLOGY | Facility: CLINIC | Age: 40
End: 2020-03-16
Payer: COMMERCIAL

## 2020-03-16 VITALS — DIASTOLIC BLOOD PRESSURE: 86 MMHG | HEART RATE: 87 BPM | SYSTOLIC BLOOD PRESSURE: 116 MMHG

## 2020-03-16 DIAGNOSIS — C64.2 RENAL CELL CARCINOMA OF LEFT KIDNEY: ICD-10-CM

## 2020-03-16 DIAGNOSIS — C64.2 RENAL CELL CARCINOMA OF LEFT KIDNEY: Primary | ICD-10-CM

## 2020-03-16 LAB
ANION GAP SERPL CALC-SCNC: 7 MMOL/L (ref 8–16)
BUN SERPL-MCNC: 17 MG/DL (ref 6–20)
CALCIUM SERPL-MCNC: 9 MG/DL (ref 8.7–10.5)
CHLORIDE SERPL-SCNC: 107 MMOL/L (ref 95–110)
CO2 SERPL-SCNC: 25 MMOL/L (ref 23–29)
CREAT SERPL-MCNC: 0.9 MG/DL (ref 0.5–1.4)
EST. GFR  (AFRICAN AMERICAN): >60 ML/MIN/1.73 M^2
EST. GFR  (NON AFRICAN AMERICAN): >60 ML/MIN/1.73 M^2
GLUCOSE SERPL-MCNC: 98 MG/DL (ref 70–110)
POTASSIUM SERPL-SCNC: 4.4 MMOL/L (ref 3.5–5.1)
SODIUM SERPL-SCNC: 139 MMOL/L (ref 136–145)

## 2020-03-16 PROCEDURE — 3079F PR MOST RECENT DIASTOLIC BLOOD PRESSURE 80-89 MM HG: ICD-10-PCS | Mod: CPTII,S$GLB,, | Performed by: UROLOGY

## 2020-03-16 PROCEDURE — 25500020 PHARM REV CODE 255: Performed by: UROLOGY

## 2020-03-16 PROCEDURE — 74170 CT ABD WO CNTRST FLWD CNTRST: CPT | Mod: TC

## 2020-03-16 PROCEDURE — 99213 OFFICE O/P EST LOW 20 MIN: CPT | Mod: S$GLB,,, | Performed by: UROLOGY

## 2020-03-16 PROCEDURE — 71046 X-RAY EXAM CHEST 2 VIEWS: CPT | Mod: 26,,, | Performed by: RADIOLOGY

## 2020-03-16 PROCEDURE — 71046 XR CHEST PA AND LATERAL: ICD-10-PCS | Mod: 26,,, | Performed by: RADIOLOGY

## 2020-03-16 PROCEDURE — 71046 X-RAY EXAM CHEST 2 VIEWS: CPT | Mod: TC

## 2020-03-16 PROCEDURE — 74170 CT ABD WO CNTRST FLWD CNTRST: CPT | Mod: 26,,, | Performed by: INTERNAL MEDICINE

## 2020-03-16 PROCEDURE — 3074F SYST BP LT 130 MM HG: CPT | Mod: CPTII,S$GLB,, | Performed by: UROLOGY

## 2020-03-16 PROCEDURE — 99999 PR PBB SHADOW E&M-EST. PATIENT-LVL III: CPT | Mod: PBBFAC,,, | Performed by: UROLOGY

## 2020-03-16 PROCEDURE — 74170 CT ABDOMEN W WO CONTRAST: ICD-10-PCS | Mod: 26,,, | Performed by: INTERNAL MEDICINE

## 2020-03-16 PROCEDURE — 3074F PR MOST RECENT SYSTOLIC BLOOD PRESSURE < 130 MM HG: ICD-10-PCS | Mod: CPTII,S$GLB,, | Performed by: UROLOGY

## 2020-03-16 PROCEDURE — 99213 PR OFFICE/OUTPT VISIT, EST, LEVL III, 20-29 MIN: ICD-10-PCS | Mod: S$GLB,,, | Performed by: UROLOGY

## 2020-03-16 PROCEDURE — 36415 COLL VENOUS BLD VENIPUNCTURE: CPT

## 2020-03-16 PROCEDURE — 3079F DIAST BP 80-89 MM HG: CPT | Mod: CPTII,S$GLB,, | Performed by: UROLOGY

## 2020-03-16 PROCEDURE — 80048 BASIC METABOLIC PNL TOTAL CA: CPT

## 2020-03-16 PROCEDURE — 99999 PR PBB SHADOW E&M-EST. PATIENT-LVL III: ICD-10-PCS | Mod: PBBFAC,,, | Performed by: UROLOGY

## 2020-03-16 RX ADMIN — IOHEXOL 100 ML: 350 INJECTION, SOLUTION INTRAVENOUS at 09:03

## 2020-03-16 NOTE — PROGRESS NOTES
Subjective:       Patient ID: Betty Faustin is a 39 y.o. female.    Chief Complaint:  Follow-up.      History of Present Illness  HPI  Patient is a 39 y.o. female who is established to our clinic and was originally referred by their urologist, Dr. Villarreal for evaluation of a left renal mass.   She developed diarrhea and GI upset which prompted abdominal imaging.  She had an incidental finding of a left renal mass.    Underwent a left robotic partial nephrectomy 5/31/19.  Uneventful.  Was admitted to outside facility with pain.  Had a CT scan (brought report)---showed post-op changes, nothing concerning.     She returns for postop imaging review.  She has had no problems.  She has not seen a genetic counselor.      PATHOLOGY:  SPECIMEN  1) Left kidney tumor.  FINAL PATHOLOGIC DIAGNOSIS  Left kidney, tumor, partial nephrectomy:  Clear cell renal cell carcinoma, measuring 2.2 cm in greatest dimension.  Synoptic comment.  Comment: Synoptic report  Specimen: Left kidney  Specimen laterality: Left  Procedure: Partial nephrectomy  Tumor size:  Greatest dimension: 2.2 cm  Tumor focality: Unifocal  Histologic type: Clear cell renal cell carcinoma  Sarcomatoid features: Not identified  Rhabdoid features: Not identified  Histologic grade: Viji nuclear grade  G2: Nucleoli conspicuous and eosinophilic at 400x magnification, visible but not prominent at 100x magnification  Tumor necrosis: Not identified  Anatomic extent of tumor: Tumor limited to the kidney  Margins: Uninvolved by invasive carcinoma  Regional Lymph Nodes: No lymph nodes submitted or found  Pathologic staging:  Primary tumor:  pT1a: Tumor greater than or equal to 4 cm in greatest dimension, limited to the kidney  Regional lymph nodes:  pNX: Regional lymph nodes cannot be assessed  Distant metastasis:  pMX: Cannot be assessed.  Pathologic findings in nonneoplastic kidney: Not identified.    Review of Systems  Review of Systems  All other systems reviewed and  negative except pertinent positives noted in HPI.       Objective:     Physical Exam   Constitutional: She is oriented to person, place, and time. She appears well-developed and well-nourished. No distress.   HENT:   Head: Normocephalic and atraumatic.   Eyes: No scleral icterus.   Neck: No tracheal deviation present.   Pulmonary/Chest: Effort normal. No respiratory distress.   Neurological: She is alert and oriented to person, place, and time.   Psychiatric: She has a normal mood and affect. Her behavior is normal. Judgment and thought content normal.       Lab Review  Lab Results   Component Value Date    COLORU Yellow 05/07/2019    SPECGRAV 1.025 05/07/2019    PHUR 6.0 05/07/2019    NITRITE Negative 05/07/2019    KETONESU Negative 05/07/2019         Assessment:        1. Renal cell carcinoma of left kidney            Plan:     Renal cell carcinoma of left kidney  -     Basic metabolic panel; Future; Expected date: 03/16/2021  -     CT Abdomen With Without Contrast; Future; Expected date: 03/16/2021  -     X-Ray Chest PA And Lateral; Future; Expected date: 03/16/2021    -referral to James Chan NP for genetic counseling per AUA guidelines   -bmp/CT/CXR in 1 year with BRYANT f/u.  I spent 15 minutes with the patient of which more than half was spent in direct consultation with the patient in regards to our treatment and plan.

## 2020-03-17 ENCOUNTER — TELEPHONE (OUTPATIENT)
Dept: HEMATOLOGY/ONCOLOGY | Facility: CLINIC | Age: 40
End: 2020-03-17

## 2021-03-15 ENCOUNTER — OFFICE VISIT (OUTPATIENT)
Dept: UROLOGY | Facility: CLINIC | Age: 41
End: 2021-03-15
Payer: COMMERCIAL

## 2021-03-15 ENCOUNTER — HOSPITAL ENCOUNTER (OUTPATIENT)
Dept: RADIOLOGY | Facility: HOSPITAL | Age: 41
Discharge: HOME | End: 2021-03-15
Attending: UROLOGY
Payer: COMMERCIAL

## 2021-03-15 DIAGNOSIS — C64.2 RENAL CELL CARCINOMA OF LEFT KIDNEY: ICD-10-CM

## 2021-03-15 DIAGNOSIS — C64.2 RENAL CELL CARCINOMA OF LEFT KIDNEY: Primary | ICD-10-CM

## 2021-03-15 DIAGNOSIS — N20.0 KIDNEY STONES: ICD-10-CM

## 2021-03-15 LAB
CREAT SERPL-MCNC: 0.9 MG/DL (ref 0.5–1.4)
SAMPLE: NORMAL

## 2021-03-15 PROCEDURE — 74170 CT ABDOMEN W WO CONTRAST: ICD-10-PCS | Mod: 26,,, | Performed by: RADIOLOGY

## 2021-03-15 PROCEDURE — 3078F PR MOST RECENT DIASTOLIC BLOOD PRESSURE < 80 MM HG: ICD-10-PCS | Mod: CPTII,S$GLB,, | Performed by: UROLOGY

## 2021-03-15 PROCEDURE — 99999 PR PBB SHADOW E&M-EST. PATIENT-LVL III: CPT | Mod: PBBFAC,,, | Performed by: UROLOGY

## 2021-03-15 PROCEDURE — 25500020 PHARM REV CODE 255: Performed by: UROLOGY

## 2021-03-15 PROCEDURE — 71046 XR CHEST PA AND LATERAL: ICD-10-PCS | Mod: 26,,, | Performed by: RADIOLOGY

## 2021-03-15 PROCEDURE — 3078F DIAST BP <80 MM HG: CPT | Mod: CPTII,S$GLB,, | Performed by: UROLOGY

## 2021-03-15 PROCEDURE — 71046 X-RAY EXAM CHEST 2 VIEWS: CPT | Mod: TC

## 2021-03-15 PROCEDURE — 74170 CT ABD WO CNTRST FLWD CNTRST: CPT | Mod: 26,,, | Performed by: RADIOLOGY

## 2021-03-15 PROCEDURE — 99999 PR PBB SHADOW E&M-EST. PATIENT-LVL III: ICD-10-PCS | Mod: PBBFAC,,, | Performed by: UROLOGY

## 2021-03-15 PROCEDURE — 74170 CT ABD WO CNTRST FLWD CNTRST: CPT | Mod: TC

## 2021-03-15 PROCEDURE — 3074F SYST BP LT 130 MM HG: CPT | Mod: CPTII,S$GLB,, | Performed by: UROLOGY

## 2021-03-15 PROCEDURE — 99214 PR OFFICE/OUTPT VISIT, EST, LEVL IV, 30-39 MIN: ICD-10-PCS | Mod: S$GLB,,, | Performed by: UROLOGY

## 2021-03-15 PROCEDURE — 3074F PR MOST RECENT SYSTOLIC BLOOD PRESSURE < 130 MM HG: ICD-10-PCS | Mod: CPTII,S$GLB,, | Performed by: UROLOGY

## 2021-03-15 PROCEDURE — 99214 OFFICE O/P EST MOD 30 MIN: CPT | Mod: S$GLB,,, | Performed by: UROLOGY

## 2021-03-15 PROCEDURE — 71046 X-RAY EXAM CHEST 2 VIEWS: CPT | Mod: 26,,, | Performed by: RADIOLOGY

## 2021-03-15 RX ADMIN — IOHEXOL 100 ML: 350 INJECTION, SOLUTION INTRAVENOUS at 09:03

## 2021-03-19 ENCOUNTER — TELEPHONE (OUTPATIENT)
Dept: HEMATOLOGY/ONCOLOGY | Facility: CLINIC | Age: 41
End: 2021-03-19

## 2021-03-19 NOTE — TELEPHONE ENCOUNTER
----- Message from James Chan DNP sent at 3/15/2021  4:49 PM CDT -----  Brianna,    Please call the pt to schedule a Cancer Genetics visit with me  Referring provider:  Dr. Srinath Killian  Appt note:  Renal cell carcinoma of left kidney    Please offer in-person and virtual--defer to patient preference        Thank you,  James    ----- Message -----  From: Srinath Killian MD  Sent: 3/15/2021   9:39 AM CDT  To: James Chan DNP

## 2021-05-12 ENCOUNTER — PATIENT MESSAGE (OUTPATIENT)
Dept: RESEARCH | Facility: HOSPITAL | Age: 41
End: 2021-05-12

## 2021-07-08 ENCOUNTER — PATIENT MESSAGE (OUTPATIENT)
Dept: UROLOGY | Facility: CLINIC | Age: 41
End: 2021-07-08

## 2021-07-08 DIAGNOSIS — N20.0 KIDNEY STONES: Primary | ICD-10-CM

## 2021-07-13 ENCOUNTER — TELEPHONE (OUTPATIENT)
Dept: RADIOLOGY | Facility: HOSPITAL | Age: 41
End: 2021-07-13

## 2021-07-14 ENCOUNTER — HOSPITAL ENCOUNTER (OUTPATIENT)
Dept: RADIOLOGY | Facility: HOSPITAL | Age: 41
Discharge: HOME | End: 2021-07-14
Attending: UROLOGY
Payer: COMMERCIAL

## 2021-07-14 DIAGNOSIS — N20.0 KIDNEY STONES: ICD-10-CM

## 2021-07-14 PROCEDURE — 76770 US EXAM ABDO BACK WALL COMP: CPT | Mod: 26,,, | Performed by: RADIOLOGY

## 2021-07-14 PROCEDURE — 76770 US EXAM ABDO BACK WALL COMP: CPT | Mod: TC

## 2021-07-14 PROCEDURE — 76770 US RETROPERITONEAL COMPLETE: ICD-10-PCS | Mod: 26,,, | Performed by: RADIOLOGY

## 2021-08-03 LAB
PAP RECOMMENDATION EXT: NORMAL
PAP SMEAR: NORMAL

## 2021-09-29 LAB — POC BETA-HCG (QUAL): NEGATIVE

## 2022-03-14 ENCOUNTER — HOSPITAL ENCOUNTER (OUTPATIENT)
Dept: RADIOLOGY | Facility: HOSPITAL | Age: 42
Discharge: HOME | End: 2022-03-14
Attending: UROLOGY
Payer: MEDICAID

## 2022-03-14 ENCOUNTER — OFFICE VISIT (OUTPATIENT)
Dept: UROLOGY | Facility: CLINIC | Age: 42
End: 2022-03-14
Payer: MEDICAID

## 2022-03-14 VITALS
BODY MASS INDEX: 41.02 KG/M2 | DIASTOLIC BLOOD PRESSURE: 84 MMHG | HEIGHT: 71 IN | SYSTOLIC BLOOD PRESSURE: 124 MMHG | HEART RATE: 93 BPM | WEIGHT: 293 LBS

## 2022-03-14 DIAGNOSIS — C64.2 RENAL CELL CARCINOMA OF LEFT KIDNEY: Primary | ICD-10-CM

## 2022-03-14 DIAGNOSIS — E66.01 MORBID OBESITY: ICD-10-CM

## 2022-03-14 DIAGNOSIS — C64.2 RENAL CELL CARCINOMA OF LEFT KIDNEY: ICD-10-CM

## 2022-03-14 DIAGNOSIS — N20.0 KIDNEY STONES: ICD-10-CM

## 2022-03-14 PROCEDURE — 74160 CT ABDOMEN W/CONTRAST: CPT | Mod: TC

## 2022-03-14 PROCEDURE — 3074F PR MOST RECENT SYSTOLIC BLOOD PRESSURE < 130 MM HG: ICD-10-PCS | Mod: CPTII,,, | Performed by: UROLOGY

## 2022-03-14 PROCEDURE — 99999 PR PBB SHADOW E&M-EST. PATIENT-LVL III: CPT | Mod: PBBFAC,,, | Performed by: UROLOGY

## 2022-03-14 PROCEDURE — 99214 OFFICE O/P EST MOD 30 MIN: CPT | Mod: S$PBB,,, | Performed by: UROLOGY

## 2022-03-14 PROCEDURE — 74160 CT ABDOMEN WITH CONTRAST: ICD-10-PCS | Mod: 26,,, | Performed by: INTERNAL MEDICINE

## 2022-03-14 PROCEDURE — 3008F PR BODY MASS INDEX (BMI) DOCUMENTED: ICD-10-PCS | Mod: CPTII,,, | Performed by: UROLOGY

## 2022-03-14 PROCEDURE — 3074F SYST BP LT 130 MM HG: CPT | Mod: CPTII,,, | Performed by: UROLOGY

## 2022-03-14 PROCEDURE — 99213 OFFICE O/P EST LOW 20 MIN: CPT | Mod: PBBFAC,25 | Performed by: UROLOGY

## 2022-03-14 PROCEDURE — 25500020 PHARM REV CODE 255: Performed by: UROLOGY

## 2022-03-14 PROCEDURE — 71046 X-RAY EXAM CHEST 2 VIEWS: CPT | Mod: 26,,, | Performed by: RADIOLOGY

## 2022-03-14 PROCEDURE — 3079F DIAST BP 80-89 MM HG: CPT | Mod: CPTII,,, | Performed by: UROLOGY

## 2022-03-14 PROCEDURE — 1159F PR MEDICATION LIST DOCUMENTED IN MEDICAL RECORD: ICD-10-PCS | Mod: CPTII,,, | Performed by: UROLOGY

## 2022-03-14 PROCEDURE — 71046 XR CHEST PA AND LATERAL: ICD-10-PCS | Mod: 26,,, | Performed by: RADIOLOGY

## 2022-03-14 PROCEDURE — 71046 X-RAY EXAM CHEST 2 VIEWS: CPT | Mod: TC

## 2022-03-14 PROCEDURE — 1159F MED LIST DOCD IN RCRD: CPT | Mod: CPTII,,, | Performed by: UROLOGY

## 2022-03-14 PROCEDURE — 74160 CT ABDOMEN W/CONTRAST: CPT | Mod: 26,,, | Performed by: INTERNAL MEDICINE

## 2022-03-14 PROCEDURE — 99999 PR PBB SHADOW E&M-EST. PATIENT-LVL III: ICD-10-PCS | Mod: PBBFAC,,, | Performed by: UROLOGY

## 2022-03-14 PROCEDURE — 99214 PR OFFICE/OUTPT VISIT, EST, LEVL IV, 30-39 MIN: ICD-10-PCS | Mod: S$PBB,,, | Performed by: UROLOGY

## 2022-03-14 PROCEDURE — 1160F RVW MEDS BY RX/DR IN RCRD: CPT | Mod: CPTII,,, | Performed by: UROLOGY

## 2022-03-14 PROCEDURE — 3008F BODY MASS INDEX DOCD: CPT | Mod: CPTII,,, | Performed by: UROLOGY

## 2022-03-14 PROCEDURE — 1160F PR REVIEW ALL MEDS BY PRESCRIBER/CLIN PHARMACIST DOCUMENTED: ICD-10-PCS | Mod: CPTII,,, | Performed by: UROLOGY

## 2022-03-14 PROCEDURE — 3079F PR MOST RECENT DIASTOLIC BLOOD PRESSURE 80-89 MM HG: ICD-10-PCS | Mod: CPTII,,, | Performed by: UROLOGY

## 2022-03-14 RX ADMIN — IOHEXOL 100 ML: 350 INJECTION, SOLUTION INTRAVENOUS at 09:03

## 2022-03-14 NOTE — PROGRESS NOTES
Subjective:       Patient ID: Betty Faustin is a 41 y.o. female.    Chief Complaint:  Follow-up.      History of Present Illness  HPI  Patient is a 41 y.o. female who is established to our clinic and was originally referred by their urologist, Dr. Villarreal for evaluation of a left renal mass.   She developed diarrhea and GI upset which prompted abdominal imaging.  She had an incidental finding of a left renal mass.    Underwent a left robotic partial nephrectomy 5/31/19.  Uneventful.  Was admitted to outside facility with pain.  Had a CT scan (brought report)---showed post-op changes, nothing concerning.     She returns for postop imaging review.  Her abdominal pain that she was experiencing has resolved.   She has not seen a genetic counselor---they reached out to her to schedule but she did not opt for an appointment at this time.       PATHOLOGY:  SPECIMEN  1) Left kidney tumor.  FINAL PATHOLOGIC DIAGNOSIS  Left kidney, tumor, partial nephrectomy:  Clear cell renal cell carcinoma, measuring 2.2 cm in greatest dimension.  Synoptic comment.  Comment: Synoptic report  Specimen: Left kidney  Specimen laterality: Left  Procedure: Partial nephrectomy  Tumor size:  Greatest dimension: 2.2 cm  Tumor focality: Unifocal  Histologic type: Clear cell renal cell carcinoma  Sarcomatoid features: Not identified  Rhabdoid features: Not identified  Histologic grade: Viji nuclear grade  G2: Nucleoli conspicuous and eosinophilic at 400x magnification, visible but not prominent at 100x magnification  Tumor necrosis: Not identified  Anatomic extent of tumor: Tumor limited to the kidney  Margins: Uninvolved by invasive carcinoma  Regional Lymph Nodes: No lymph nodes submitted or found  Pathologic staging:  Primary tumor:  pT1a: Tumor greater than or equal to 4 cm in greatest dimension, limited to the kidney  Regional lymph nodes:  pNX: Regional lymph nodes cannot be assessed  Distant metastasis:  pMX: Cannot be  assessed.  Pathologic findings in nonneoplastic kidney: Not identified.    Review of Systems  Review of Systems  All other systems reviewed and negative except pertinent positives noted in HPI.       Objective:     Physical Exam  Constitutional:       General: She is not in acute distress.     Appearance: She is well-developed.   HENT:      Head: Normocephalic and atraumatic.   Eyes:      General: No scleral icterus.  Neck:      Trachea: No tracheal deviation.   Pulmonary:      Effort: Pulmonary effort is normal. No respiratory distress.   Neurological:      Mental Status: She is alert and oriented to person, place, and time.   Psychiatric:         Behavior: Behavior normal.         Thought Content: Thought content normal.         Judgment: Judgment normal.         Lab Review  Lab Results   Component Value Date    COLORU Yellow 07/14/2021    SPECGRAV 1.015 07/14/2021    PHUR 6.0 07/14/2021    NITRITE Negative 07/14/2021    KETONESU Negative 07/14/2021         Assessment:        1. Renal cell carcinoma of left kidney    2. Kidney stones    3. Morbid obesity            Plan:     Renal cell carcinoma of left kidney  -     Comprehensive Metabolic Panel; Future; Expected date: 03/14/2023  -     X-Ray Chest PA And Lateral; Future; Expected date: 03/14/2023    Kidney stones  -     X-Ray Abdomen AP 1 View; Future; Expected date: 03/14/2023    Morbid obesity       1. RCC:  -previously placed referral to James Chan NP for genetic counseling per AUA guidelines.  This has not occurred yet and patient is not sure she is interested despite guideline recommendations.   -CXR was independently reviewed today and reveals no abnormalities  -CT scan was independently reviewed today and reveals post-op changes of the left kidney, non-obstructing renal stone in the lower pole of the left kidney.   -plan cmp and cxr in 1 year.  Per NCCN guidelines, abdominal imaging be on 3 years not indicated at this time.      2. Kidney  stones  -General risk factors for kidney stones and the conservative measures to prevent kidney stones in the future were discussed with the patient in detail.  The patient was encouraged to drink 2-3 liters of water a day, limit iced tea and glo as well as foods high in oxalate.  They were cautioned to try to limit salt and red meat intake.  We also discussed adding citrate to the diet with the addition of timothy or lemon juice to their water or alternatively with crystal light.   -KUB in 1 year    3. Morbid obesity:  -discussed diet and exercise for weight loss  -discussed the link between obesity and kidney stones.

## 2022-04-10 ENCOUNTER — HISTORICAL (OUTPATIENT)
Dept: ADMINISTRATIVE | Facility: HOSPITAL | Age: 42
End: 2022-04-10
Payer: MEDICAID

## 2022-04-25 ENCOUNTER — PATIENT MESSAGE (OUTPATIENT)
Dept: UROLOGY | Facility: CLINIC | Age: 42
End: 2022-04-25
Payer: MEDICAID

## 2022-04-30 VITALS
HEIGHT: 71 IN | WEIGHT: 293 LBS | BODY MASS INDEX: 41.02 KG/M2 | DIASTOLIC BLOOD PRESSURE: 79 MMHG | SYSTOLIC BLOOD PRESSURE: 137 MMHG

## 2022-08-02 ENCOUNTER — DOCUMENTATION ONLY (OUTPATIENT)
Dept: ADMINISTRATIVE | Facility: HOSPITAL | Age: 42
End: 2022-08-02
Payer: MEDICAID

## 2022-09-21 ENCOUNTER — HISTORICAL (OUTPATIENT)
Dept: ADMINISTRATIVE | Facility: HOSPITAL | Age: 42
End: 2022-09-21
Payer: MEDICAID

## 2023-01-17 ENCOUNTER — DOCUMENTATION ONLY (OUTPATIENT)
Dept: ADMINISTRATIVE | Facility: HOSPITAL | Age: 43
End: 2023-01-17
Payer: MEDICAID

## 2023-03-14 ENCOUNTER — HOSPITAL ENCOUNTER (OUTPATIENT)
Dept: RADIOLOGY | Facility: HOSPITAL | Age: 43
Discharge: HOME | End: 2023-03-14
Attending: UROLOGY
Payer: MEDICAID

## 2023-03-14 ENCOUNTER — OFFICE VISIT (OUTPATIENT)
Dept: UROLOGY | Facility: CLINIC | Age: 43
End: 2023-03-14
Payer: MEDICAID

## 2023-03-14 VITALS
HEIGHT: 71 IN | BODY MASS INDEX: 33.72 KG/M2 | SYSTOLIC BLOOD PRESSURE: 115 MMHG | HEART RATE: 84 BPM | DIASTOLIC BLOOD PRESSURE: 76 MMHG | WEIGHT: 240.88 LBS

## 2023-03-14 DIAGNOSIS — N20.0 KIDNEY STONES: ICD-10-CM

## 2023-03-14 DIAGNOSIS — C64.2 RENAL CELL CARCINOMA OF LEFT KIDNEY: ICD-10-CM

## 2023-03-14 DIAGNOSIS — I10 ESSENTIAL HYPERTENSION: ICD-10-CM

## 2023-03-14 DIAGNOSIS — C64.2 RENAL CELL CARCINOMA OF LEFT KIDNEY: Primary | ICD-10-CM

## 2023-03-14 PROCEDURE — 1159F PR MEDICATION LIST DOCUMENTED IN MEDICAL RECORD: ICD-10-PCS | Mod: CPTII,,, | Performed by: UROLOGY

## 2023-03-14 PROCEDURE — 3008F PR BODY MASS INDEX (BMI) DOCUMENTED: ICD-10-PCS | Mod: CPTII,,, | Performed by: UROLOGY

## 2023-03-14 PROCEDURE — 3008F BODY MASS INDEX DOCD: CPT | Mod: CPTII,,, | Performed by: UROLOGY

## 2023-03-14 PROCEDURE — 3074F PR MOST RECENT SYSTOLIC BLOOD PRESSURE < 130 MM HG: ICD-10-PCS | Mod: CPTII,,, | Performed by: UROLOGY

## 2023-03-14 PROCEDURE — 99214 OFFICE O/P EST MOD 30 MIN: CPT | Mod: S$PBB,,, | Performed by: UROLOGY

## 2023-03-14 PROCEDURE — 74018 XR ABDOMEN AP 1 VIEW: ICD-10-PCS | Mod: 26,,, | Performed by: RADIOLOGY

## 2023-03-14 PROCEDURE — 71046 XR CHEST PA AND LATERAL: ICD-10-PCS | Mod: 26,,, | Performed by: RADIOLOGY

## 2023-03-14 PROCEDURE — 99999 PR PBB SHADOW E&M-EST. PATIENT-LVL III: CPT | Mod: PBBFAC,,, | Performed by: UROLOGY

## 2023-03-14 PROCEDURE — 1160F RVW MEDS BY RX/DR IN RCRD: CPT | Mod: CPTII,,, | Performed by: UROLOGY

## 2023-03-14 PROCEDURE — 99999 PR PBB SHADOW E&M-EST. PATIENT-LVL III: ICD-10-PCS | Mod: PBBFAC,,, | Performed by: UROLOGY

## 2023-03-14 PROCEDURE — 71046 X-RAY EXAM CHEST 2 VIEWS: CPT | Mod: 26,,, | Performed by: RADIOLOGY

## 2023-03-14 PROCEDURE — 3078F DIAST BP <80 MM HG: CPT | Mod: CPTII,,, | Performed by: UROLOGY

## 2023-03-14 PROCEDURE — 1160F PR REVIEW ALL MEDS BY PRESCRIBER/CLIN PHARMACIST DOCUMENTED: ICD-10-PCS | Mod: CPTII,,, | Performed by: UROLOGY

## 2023-03-14 PROCEDURE — 3074F SYST BP LT 130 MM HG: CPT | Mod: CPTII,,, | Performed by: UROLOGY

## 2023-03-14 PROCEDURE — 71046 X-RAY EXAM CHEST 2 VIEWS: CPT | Mod: TC

## 2023-03-14 PROCEDURE — 3078F PR MOST RECENT DIASTOLIC BLOOD PRESSURE < 80 MM HG: ICD-10-PCS | Mod: CPTII,,, | Performed by: UROLOGY

## 2023-03-14 PROCEDURE — 74018 RADEX ABDOMEN 1 VIEW: CPT | Mod: 26,,, | Performed by: RADIOLOGY

## 2023-03-14 PROCEDURE — 74018 RADEX ABDOMEN 1 VIEW: CPT | Mod: TC

## 2023-03-14 PROCEDURE — 99213 OFFICE O/P EST LOW 20 MIN: CPT | Mod: PBBFAC,25 | Performed by: UROLOGY

## 2023-03-14 PROCEDURE — 99214 PR OFFICE/OUTPT VISIT, EST, LEVL IV, 30-39 MIN: ICD-10-PCS | Mod: S$PBB,,, | Performed by: UROLOGY

## 2023-03-14 PROCEDURE — 1159F MED LIST DOCD IN RCRD: CPT | Mod: CPTII,,, | Performed by: UROLOGY

## 2023-03-14 NOTE — PROGRESS NOTES
Subjective:       Patient ID: Betty Faustin is a 42 y.o. female.    Chief Complaint:  Follow-up.      History of Present Illness  HPI  Patient is a 42 y.o. female who is established to our clinic and was originally referred by their urologist, Dr. Villarreal for evaluation of a left renal mass.   She developed diarrhea and GI upset which prompted abdominal imaging.  She had an incidental finding of a left renal mass.    Underwent a left robotic partial nephrectomy 5/31/19.      She returns for surveillance imaging review.    CXR from 3/14/23 was independently reviewed today and reveals no metastatic disease.   KUB from 3/14/23 was independently reviewed today and reveals no kidney stones.     No cross-sectional abdominal imaging.   Denies gross hematuria.  No unexplained weight loss.           PATHOLOGY:  SPECIMEN  1) Left kidney tumor.  FINAL PATHOLOGIC DIAGNOSIS  Left kidney, tumor, partial nephrectomy:  Clear cell renal cell carcinoma, measuring 2.2 cm in greatest dimension.  Synoptic comment.  Comment: Synoptic report  Specimen: Left kidney  Specimen laterality: Left  Procedure: Partial nephrectomy  Tumor size:  Greatest dimension: 2.2 cm  Tumor focality: Unifocal  Histologic type: Clear cell renal cell carcinoma  Sarcomatoid features: Not identified  Rhabdoid features: Not identified  Histologic grade: Viji nuclear grade  G2: Nucleoli conspicuous and eosinophilic at 400x magnification, visible but not prominent at 100x magnification  Tumor necrosis: Not identified  Anatomic extent of tumor: Tumor limited to the kidney  Margins: Uninvolved by invasive carcinoma  Regional Lymph Nodes: No lymph nodes submitted or found  Pathologic staging:  Primary tumor:  pT1a: Tumor greater than or equal to 4 cm in greatest dimension, limited to the kidney  Regional lymph nodes:  pNX: Regional lymph nodes cannot be assessed  Distant metastasis:  pMX: Cannot be assessed.  Pathologic findings in nonneoplastic kidney: Not  identified.    Review of Systems  Review of Systems  All other systems reviewed and negative except pertinent positives noted in HPI.       Objective:     Physical Exam  Constitutional:       General: She is not in acute distress.     Appearance: She is well-developed.   HENT:      Head: Normocephalic and atraumatic.   Eyes:      General: No scleral icterus.  Neck:      Trachea: No tracheal deviation.   Pulmonary:      Effort: Pulmonary effort is normal. No respiratory distress.   Neurological:      Mental Status: She is alert and oriented to person, place, and time.   Psychiatric:         Behavior: Behavior normal.         Thought Content: Thought content normal.         Judgment: Judgment normal.       Lab Review  Lab Results   Component Value Date    COLORU Yellow 07/14/2021    SPECGRAV 1.015 07/14/2021    PHUR 6.0 07/14/2021    NITRITE Negative 07/14/2021    KETONESU Negative 07/14/2021    UROBILINOGEN >=8.0 06/03/2019         Assessment:        1. Renal cell carcinoma of left kidney    2. Essential hypertension    3. Kidney stones              Plan:     Renal cell carcinoma of left kidney  -     MRI Abdomen W WO Contrast; Future; Expected date: 03/14/2023  -     Comprehensive Metabolic Panel; Future; Expected date: 03/14/2024  -     MRI Abdomen W WO Contrast; Future; Expected date: 03/14/2024  -     X-Ray Chest PA And Lateral; Future; Expected date: 03/14/2024    Essential hypertension    Kidney stones         1. RCC:  -previously placed referral to James Chan NP for genetic counseling per AUA guidelines.  This has not occurred yet and patient is not sure she is interested despite guideline recommendations.   -CXR was independently reviewed today and reveals no abnormalities  -plan for MRI abdomen==== will notify of results.   -plan cmp, mri abd, and cxr in 1 year.   NCCN guidelines shifted to annual imaging through 5 years of the abdomen.  Will arrange mri in [place].     2. Kidney stones  -General risk  factors for kidney stones and the conservative measures to prevent kidney stones in the future were discussed with the patient in detail.  The patient was encouraged to drink 2-3 liters of water a day, limit iced tea and glo as well as foods high in oxalate.  They were cautioned to try to limit salt and red meat intake.  We also discussed adding citrate to the diet with the addition of timothy or lemon juice to their water or alternatively with crystal light.   -kub reviewed as above.     3. HTN:  --BP reviewed  -stable, continue meds and f/u with PCP

## 2024-01-18 ENCOUNTER — TELEPHONE (OUTPATIENT)
Dept: UROLOGY | Facility: CLINIC | Age: 44
End: 2024-01-18
Payer: MEDICAID

## 2024-01-18 NOTE — TELEPHONE ENCOUNTER
I spoke with patient Betty now.  She has an elective surgery scheduled in March.  She states she had a platelet count lab somewhere else that she was worried about.  Patient will wait until she speaks with her provider that will schedule the elective surgery before she rescheduled her annual imaging currently scheduled for March 12, 2024.  Thank you.    ----- Message from Sasha Andersen sent at 1/18/2024  2:24 PM CST -----  Regarding: Sooner appt request  Contact: @ [phone]  Pt is calling to speak to someone in the office; asking for a sooner appt than what they are scheduled for. Pt is already on the wait list; no available appts in Epic that are coming up soon. Please call to advise. Thanks.    Reason for sooner appt: Pt is having surgery and wants to be seen before that surgery.     When is the first available appointment? No available appts in Epic    Communication Preference: both call back and message through pt portal     Additional Information:  Pt is wanting to move the appt up to February

## 2024-02-06 ENCOUNTER — OFFICE VISIT (OUTPATIENT)
Dept: OBSTETRICS AND GYNECOLOGY | Facility: CLINIC | Age: 44
End: 2024-02-06
Payer: MEDICAID

## 2024-02-06 VITALS
SYSTOLIC BLOOD PRESSURE: 122 MMHG | DIASTOLIC BLOOD PRESSURE: 60 MMHG | BODY MASS INDEX: 28.45 KG/M2 | HEART RATE: 90 BPM | WEIGHT: 204 LBS

## 2024-02-06 DIAGNOSIS — Z01.419 ROUTINE GYNECOLOGICAL EXAMINATION: Primary | ICD-10-CM

## 2024-02-06 DIAGNOSIS — D75.839 THROMBOCYTOSIS: ICD-10-CM

## 2024-02-06 PROCEDURE — 99396 PREV VISIT EST AGE 40-64: CPT | Mod: ,,, | Performed by: OBSTETRICS & GYNECOLOGY

## 2024-02-06 PROCEDURE — 3078F DIAST BP <80 MM HG: CPT | Mod: CPTII,,, | Performed by: OBSTETRICS & GYNECOLOGY

## 2024-02-06 PROCEDURE — 3008F BODY MASS INDEX DOCD: CPT | Mod: CPTII,,, | Performed by: OBSTETRICS & GYNECOLOGY

## 2024-02-06 PROCEDURE — 3074F SYST BP LT 130 MM HG: CPT | Mod: CPTII,,, | Performed by: OBSTETRICS & GYNECOLOGY

## 2024-02-06 RX ORDER — MECOBALAMIN 1000 MCG
TABLET,CHEWABLE ORAL
COMMUNITY

## 2024-02-06 RX ORDER — TRANEXAMIC ACID 650 MG/1
TABLET ORAL
COMMUNITY

## 2024-02-06 NOTE — PROGRESS NOTES
Patient ID: [id number]   Chief Complaint: Annual exam  Chief Complaint   Patient presents with    Annual Exam     NO C/O'S.     HPI:   Betty Faustin is a 43 y.o. year old  here for her Annual Exam. Patient's last menstrual period was 2024. She is doing well. Denies any health changes. Annual Exam (NO C/O'S.)  HAS HISTORY OF THROMBOCYTOSIS, WOULD LIKE CBC CHECKED.   LYSTEDA WORKING WELL MANAGING HEAVY CYCLES.   Subjective:     Past Medical History:   Diagnosis Date    GERD (gastroesophageal reflux disease)     Hypertension      Past Surgical History:   Procedure Laterality Date     SECTION      ,      CHOLECYSTECTOMY  2002    ROBOT-ASSISTED LAPAROSCOPIC PARTIAL NEPHRECTOMY USING DA ALEJO XI Left 2019    Procedure: XI ROBOTIC NEPHRECTOMY, PARTIAL;  Surgeon: Srinath Killian MD;  Location: [facility] OR [address] [place] [address];  Service: Urology;  Laterality: Left;  4hrs  [employer] u/s probe confirmation#[id number] NC for 12pm case    TONSILLECTOMY      TUBAL LIGATION       Social History     Tobacco Use    Smoking status: Never    Smokeless tobacco: Never   Substance Use Topics    Alcohol use: Yes     Comment: 1-2 times a year     Drug use: Never     Family History   Problem Relation Age of Onset    Valvular heart disease Mother     Breast cancer Mother      OB History    Para Term  AB Living   2 2 2     2   SAB IAB Ectopic Multiple Live Births           2      # Outcome Date GA Lbr Ludin/2nd Weight Sex Delivery Anes PTL Lv   2 Term / 39w0d  3.175 kg (7 lb) M Vag-Spont EPI N SANTI   1 Term 08 39w0d  2.722 kg (6 lb) M Vag-Spont EPI N SANTI       Current Outpatient Medications:     calcium carbonate 195 mg calcium (500 mg) Chew, Take by mouth., Disp: , Rfl:     mecobalamin, vitamin B12, 1,000 mcg Chew, Take by mouth., Disp: , Rfl:     pantoprazole (PROTONIX) 40 MG tablet, Take 40 mg by mouth every evening. , Disp: , Rfl: 11    tranexamic acid (LYSTEDA) 650 mg tablet, SMARTSI-2  Tablet(s) By Mouth Every 12 Hours, Disp: , Rfl:     doxycycline (VIBRA-TABS) 100 MG tablet, Take 1 tablet (100 mg total) by mouth 2 (two) times daily. for 10 days, Disp: 20 tablet, Rfl: 0    ferrous sulfate 27 mg iron Tab, Take by mouth., Disp: , Rfl:   MENARCHEAL:  Cycle Length: 6 days   Flow: normal  Dysmenorrhea: No  Intermenstrual Bleeding: No  PAP:  Last PAP: 2/12/2024    History of Abnormal PAP Smear: YES:   Treated: UNSURE  HPV Vaccine: NO  INTERCOURSE:  Dyspareunia: No  Postcoital Bleeding: No  History of STI: No   If yes, then: No   Current Birth Control Method: tubal ligation  Sexually Active: yes  BREAST HISTORY:   Last Mamm[facility]: [facility]  History of Abnormal Mammogram: NO  MENOPAUSE:  Post Menopausal Bleeding: No  Hormone Replacement Therapy: No  Review of Systems 12 point review of systems conducted, negative except as stated in the history of present illness. See HPI for details.  Objective:   Visit Vitals  /60   Pulse 90   Wt 92.5 kg (204 lb)   LMP 01/26/2024   BMI 28.45 kg/m²     No results found for this or any previous visit (from the past 24 hour(s)).  Physical Exam:  Physical Exam  Constitutional:  General Appearance : alert, in no acute distress, normal, well nourished.  Respiratory:  Respiratory Effort: normal.  Breast:  Right: Inspection/palpation: no discharge, no masses present, no nipple retraction, no skin changes, no skin dimpling, no tenderness, no lymphadenopathy, no axillary mass, no axillary tenderness.  Left: Inspection/palpation: no discharge, no masses present, no nipple retraction, no skin changes, no skin dimpling, no tenderness, no lymphadenopathy, no axillary mass, no axillary tenderness.  Gastrointestinal:  Abdomen: no masses. no tender, nondistended.  Liver and spleen: normal  Hernias: no hernias present, no inguinal adenopathy.  Genitourinary:  External Genitalia: normal, no lesions.  Vagina: normal appearance, no abnormal discharge, no  lesions.  Bladder: no mass, nontender.  Urethra: no erythema or lesions present.  Cervix: no lesions, non tender. Pap Done DECLINED STD TESTING  Uterus: nontender, normal contour, normal mobility, normal size.   Adnexa: no masses, no tenderness.  Anus and Perineum: visually normal.   Chaperone Present  No results found for this or any previous visit (from the past 24 hour(s)).  Assessment/Plan:   Assessment:   Routine gynecological examination  -     Liquid-Based Pap Smear, Screening Screening    Thrombocytosis  -     CBC Auto Differential; Future; Expected date: 02/06/2024    Follow up in about 1 year (around 2/6/2025) for [employer]. In addition to their scheduled FU, the patient has also been instructed to follow up on as needed basis. All questions were answered and the patient voiced understanding of the above issues.

## 2024-02-12 LAB — PSYCHE PATHOLOGY RESULT: NORMAL

## 2024-03-26 ENCOUNTER — HOSPITAL ENCOUNTER (OUTPATIENT)
Dept: WOUND CARE | Facility: HOSPITAL | Age: 44
Discharge: HOME | End: 2024-03-26
Attending: NURSE PRACTITIONER
Payer: MEDICAID

## 2024-03-26 VITALS — SYSTOLIC BLOOD PRESSURE: 132 MMHG | DIASTOLIC BLOOD PRESSURE: 79 MMHG | HEART RATE: 70 BPM

## 2024-03-26 DIAGNOSIS — Z98.890 HISTORY OF COSMETIC SURGERY: ICD-10-CM

## 2024-03-26 DIAGNOSIS — T81.32XA DEHISCENCE OF INTERNAL SURGICAL WOUND, INITIAL ENCOUNTER: Primary | ICD-10-CM

## 2024-03-26 DIAGNOSIS — Z78.9 PRESENCE OF SURGICAL INCISION: ICD-10-CM

## 2024-03-26 PROBLEM — T81.329A WOUND DEHISCENCE, INTERNAL OPERATION: Status: ACTIVE | Noted: 2024-03-26

## 2024-03-26 PROCEDURE — 27000999 HC MEDICAL RECORD PHOTO DOCUMENTATION

## 2024-03-26 PROCEDURE — 99212 OFFICE O/P EST SF 10 MIN: CPT

## 2024-03-26 RX ORDER — DOXYCYCLINE HYCLATE 100 MG
100 TABLET ORAL 2 TIMES DAILY
Qty: 20 TABLET | Refills: 0 | Status: SHIPPED | OUTPATIENT
Start: 2024-03-26 | End: 2024-04-05

## 2024-03-26 NOTE — PROGRESS NOTES
"    Subjective:       Patient ID: Betty Faustin is a 43 y.o. female.    Chief Complaint: Wound Care (Left arm/Axilla/Right Arm/Axilla/Horizontal lower abdomen/hips/Abdominal midline/Horizontal back/chest//)    HPI  Ms. Faustin is a 43 year old  female who is being seen for multiple surgical incisions.  These incisions are the result of cosmetic surgery procedures which occurred in [place] on 3/6/24.  She was discharged to home following surgery for an oral abx.  She does not know the name of that abx but completed it three days ago.  She has multiple incisions that are, for the most part, approximated.  She does still have many staples in place as well as a LINUS drain on each side of her lower abdomen.  She was told to "pull" the drains when there is less that 25 cc of exudate per day.  She reports that she is still significantly above that amount today.   The exudate and drains were not assessed today by this provider.  Her procedures included arm lift (Brachioplasty), Matilda de lis Abdominoplasty and a  reverse tummy tuck.  She was instructed to begin application of Betadine on all incision lines.  We will plan to debride the fibrin at her next visit as well as remove stables that appear ready.  There is a small dehisced area are the left lower abdominal quadrant.  There is also erythema at the back incision.  This may be due to friction as she is only wearing a T-shirt.  She was instructed to begin using an ABD pad over the back incision to prevent irritation.  Out of an abundance of caution, an Rx for Doxycycline, 10 days, was sent to her pharmacy.    Review of Systems      Objective:      Vitals:    03/26/24 1330   BP: 132/79   Pulse: 70       Physical Exam       Altered Skin Integrity 03/26/24 1401 Left upper Arm #1 Other (comment) (Active)   03/26/24 1401   Altered Skin Integrity Present on Admission - Did Patient arrive to the hospital with altered skin?: yes   Side: Left   Orientation: upper "   Location: Arm   Wound Number: #1   Is this injury device related?: Other (see comments)   Primary Wound Type: Other   Description of Altered Skin Integrity:    Ankle-Brachial Index:    Pulses:    Removal Indication and Assessment:    Wound Outcome:    (Retired) Wound Length (cm):    (Retired) Wound Width (cm):    (Retired) Depth (cm):    Wound Description (Comments):    Removal Indications:    Description of Altered Skin Integrity Full thickness tissue loss. Subcutaneous fat may be visible but bone, tendon or muscle are not exposed 03/26/24 1456   Dressing Appearance Open to air 03/26/24 1456   Drainage Amount Moderate 03/26/24 1456   Drainage Characteristics/Odor Serosanguineous 03/26/24 1456   Appearance Intact;Eschar;Slough;Moist 03/26/24 1456   Tissue loss description Full thickness 03/26/24 1456   Periwound Area Intact 03/26/24 1456   Wound Edges Open 03/26/24 1456   Wound Length (cm) 25 cm 03/26/24 1456   Wound Width (cm) 0.2 cm 03/26/24 1456   Wound Depth (cm) 0.2 cm 03/26/24 1456   Wound Volume (cm^3) 1 cm^3 03/26/24 1456   Wound Surface Area (cm^2) 5 cm^2 03/26/24 1456   Care Cleansed with:;Wound cleanser 03/26/24 1456   Dressing Applied;Other (comment) 03/26/24 1456   Dressing Change Due 03/27/24 03/26/24 1456            Altered Skin Integrity 03/26/24 1404 Right upper Arm #2 Other (comment) (Active)   03/26/24 1404   Altered Skin Integrity Present on Admission - Did Patient arrive to the hospital with altered skin?: yes   Side: Right   Orientation: upper   Location: Arm   Wound Number: #2   Is this injury device related?: Other (see comments)   Primary Wound Type: Other   Description of Altered Skin Integrity:    Ankle-Brachial Index:    Pulses:    Removal Indication and Assessment:    Wound Outcome:    (Retired) Wound Length (cm):    (Retired) Wound Width (cm):    (Retired) Depth (cm):    Wound Description (Comments):    Removal Indications:    Description of Altered Skin Integrity Full thickness  tissue loss. Subcutaneous fat may be visible but bone, tendon or muscle are not exposed 03/26/24 1456   Dressing Appearance Open to air 03/26/24 1456   Drainage Amount Moderate 03/26/24 1456   Drainage Characteristics/Odor Serosanguineous 03/26/24 1456   Appearance Intact;Slough;Eschar;Moist 03/26/24 1456   Tissue loss description Full thickness 03/26/24 1456   Periwound Area Intact 03/26/24 1456   Wound Edges Open 03/26/24 1456   Wound Length (cm) 13 cm 03/26/24 1456   Wound Width (cm) 0.2 cm 03/26/24 1456   Wound Depth (cm) 0.2 cm 03/26/24 1456   Wound Volume (cm^3) 0.52 cm^3 03/26/24 1456   Wound Surface Area (cm^2) 2.6 cm^2 03/26/24 1456   Care Cleansed with:;Wound cleanser 03/26/24 1456   Dressing Other (comment);Applied 03/26/24 1456   Dressing Change Due 03/27/24 03/26/24 1456            Altered Skin Integrity 03/26/24 1421  lower Abdomen #3 (Active)   03/26/24 1421   Altered Skin Integrity Present on Admission - Did Patient arrive to the hospital with altered skin?: yes   Side:    Orientation: lower   Location: Abdomen   Wound Number: #3   Is this injury device related?: No   Primary Wound Type:    Description of Altered Skin Integrity:    Ankle-Brachial Index:    Pulses:    Removal Indication and Assessment:    Wound Outcome:    (Retired) Wound Length (cm):    (Retired) Wound Width (cm):    (Retired) Depth (cm):    Wound Description (Comments):    Removal Indications:    Description of Altered Skin Integrity Full thickness tissue loss. Subcutaneous fat may be visible but bone, tendon or muscle are not exposed 03/26/24 1456   Dressing Appearance Intact;Open to air 03/26/24 1456   Drainage Amount Moderate 03/26/24 1456   Drainage Characteristics/Odor Serosanguineous 03/26/24 1456   Appearance Intact;Slough;Moist;Eschar;Staples intact;Sutures intact 03/26/24 1456   Tissue loss description Full thickness 03/26/24 1456   Periwound Area Intact;Dry 03/26/24 1456   Wound Length (cm) 128 cm 03/26/24 1456   Wound  Width (cm) 0.2 cm 03/26/24 1456   Wound Depth (cm) 0.2 cm 03/26/24 1456   Wound Volume (cm^3) 5.12 cm^3 03/26/24 1456   Wound Surface Area (cm^2) 25.6 cm^2 03/26/24 1456   Care Cleansed with:;Wound cleanser 03/26/24 1456   Dressing Applied;Other (comment) 03/26/24 1456   Dressing Change Due 03/27/24 03/26/24 1456            Altered Skin Integrity 03/26/24 1423 midline Abdomen #4 (Active)   03/26/24 1423   Altered Skin Integrity Present on Admission - Did Patient arrive to the hospital with altered skin?: yes   Side:    Orientation: midline   Location: Abdomen   Wound Number: #4   Is this injury device related?: Other (see comments)   Primary Wound Type:    Description of Altered Skin Integrity:    Ankle-Brachial Index:    Pulses:    Removal Indication and Assessment:    Wound Outcome:    (Retired) Wound Length (cm):    (Retired) Wound Width (cm):    (Retired) Depth (cm):    Wound Description (Comments):    Removal Indications:    Description of Altered Skin Integrity Full thickness tissue loss. Subcutaneous fat may be visible but bone, tendon or muscle are not exposed 03/26/24 1456   Dressing Appearance Open to air 03/26/24 1456   Drainage Amount Moderate 03/26/24 1456   Drainage Characteristics/Odor Serosanguineous 03/26/24 1456   Appearance Intact;Slough;Eschar;Moist 03/26/24 1456   Tissue loss description Full thickness 03/26/24 1456   Periwound Area Intact 03/26/24 1456   Wound Edges Open 03/26/24 1456   Wound Length (cm) 29 cm 03/26/24 1456   Wound Width (cm) 0.2 cm 03/26/24 1456   Wound Depth (cm) 0.2 cm 03/26/24 1456   Wound Volume (cm^3) 1.16 cm^3 03/26/24 1456   Wound Surface Area (cm^2) 5.8 cm^2 03/26/24 1456   Care Cleansed with:;Wound cleanser 03/26/24 1456   Dressing Applied;Other (comment) 03/26/24 1456   Dressing Change Due 03/27/24 03/26/24 1456            Altered Skin Integrity 03/26/24 1425 upper Chest #5 (Active)   03/26/24 1425   Altered Skin Integrity Present on Admission - Did Patient arrive to  the hospital with altered skin?: yes   Side:    Orientation: upper   Location: Chest   Wound Number: #5   Is this injury device related?: Other (see comments)   Primary Wound Type:    Description of Altered Skin Integrity:    Ankle-Brachial Index:    Pulses:    Removal Indication and Assessment:    Wound Outcome:    (Retired) Wound Length (cm):    (Retired) Wound Width (cm):    (Retired) Depth (cm):    Wound Description (Comments):    Removal Indications:    Description of Altered Skin Integrity Full thickness tissue loss. Subcutaneous fat may be visible but bone, tendon or muscle are not exposed 03/26/24 1456   Dressing Appearance Open to air 03/26/24 1456   Drainage Amount Moderate 03/26/24 1456   Drainage Characteristics/Odor Serosanguineous 03/26/24 1456   Appearance Intact;Eschar;Slough;Moist 03/26/24 1456   Tissue loss description Full thickness 03/26/24 1456   Periwound Area Intact 03/26/24 1456   Wound Edges Open 03/26/24 1456   Wound Length (cm) 94 cm 03/26/24 1456   Wound Width (cm) 0.3 cm 03/26/24 1456   Wound Depth (cm) 0.2 cm 03/26/24 1456   Wound Volume (cm^3) 5.64 cm^3 03/26/24 1456   Wound Surface Area (cm^2) 28.2 cm^2 03/26/24 1456   Care Cleansed with:;Wound cleanser 03/26/24 1456   Dressing Applied;Other (comment) 03/26/24 1456   Dressing Change Due 03/27/24 03/26/24 1456         Assessment:         ICD-10-CM ICD-9-CM   1. Dehiscence of internal surgical wound, initial encounter  T81.32XA 998.31   2. Presence of surgical incision  Z78.9 V49.89   3. History of cosmetic surgery  Z98.890 V45.89           Procedures:       Left arm/Axilla                                              Right arm/axilla  Betadine                 Right lower abdomen/Hips  Betadine       Abdominal midline  Betadine               Left anterior upper chest                              Left lateral upper chest                             Upper back                                                  Right lateral upper chest       "                     Right anterior upper chest  Betadine  TR    Mechanical debridement only    [] Yes [] No   I & D performed  [] Yes [] No   Excisional debridement performed  [] Yes [] No   Selective debridement performed           [x] Yes [] No   Mechanical debridement performed         [] Yes [] No  Silver nitrate applied                                     [] Yes [] No  Labs ordered this visit                                  [] Yes [] No   Imaging ordered this visit                           [] Yes [] No   Tissue pathology and/or culture taken             MEDICATIONS    Current Outpatient Medications:     calcium carbonate 195 mg calcium (500 mg) Chew, Take by mouth., Disp: , Rfl:     doxycycline (VIBRA-TABS) 100 MG tablet, Take 1 tablet (100 mg total) by mouth 2 (two) times daily. for 10 days, Disp: 20 tablet, Rfl: 0    ferrous sulfate 27 mg iron Tab, Take by mouth., Disp: , Rfl:     mecobalamin, vitamin B12, 1,000 mcg Chew, Take by mouth., Disp: , Rfl:     omega-3-dha-epa-dpa-fish oil 1,050 mg(300 mg -675 mg-75 mg) Cap, Take by mouth., Disp: , Rfl:     pantoprazole (PROTONIX) 40 MG tablet, Take 40 mg by mouth every evening. , Disp: , Rfl: 11    tranexamic acid (LYSTEDA) 650 mg tablet, SMARTSI-2 Tablet(s) By Mouth Every 12 Hours, Disp: , Rfl:    Review of patient's allergies indicates:   Allergen Reactions    Penicillins      At age 18 years , had tonsils removed and had Penicillin   Had a rash       DIAGNOSTICS      Labs/Scans/Micro:    CBC:   Lab Results   Component Value Date    WBC 8.2 2019    HGB 10.0 (L) 2019    HCT 31.6 (L) 2019    MCV 83.8 2019     (H) 2019       Sedimentation rate: No results found for: "SEDRATE" C-reactive protein: No results found for: "CRP" Chemistry: [unfilled]  HBA1C: No components found for: "HBA1C"    Ankle Brachial Index: No results found for this or any previous visit.      Imaging:    Plain film: No results found for this or any " previous visit.    MRI: No results found for this or any previous visit.   No results found for this or any previous visit.    Bone Scan: No results found for this or any previous visit.   No results found for this or any previous visit.        Microbiology: Rx for Doxy sent to pharmacy    HOME HEALTH AGENCY:  NONE  TIMES PER WEEK/DAYS:    WOUND CARE ORDERS: Cleanse all incisions with dial soap and water and apply betadine solution to all areas daily.      Follow up in about 1 week (around 4/2/2024) for Stretcher.

## 2024-03-28 NOTE — ADDENDUM NOTE
Encounter addended by: Amalia Holloway NP on: 3/28/2024 1:46 PM   Actions taken: Clinical Note Signed

## 2024-04-02 ENCOUNTER — HOSPITAL ENCOUNTER (OUTPATIENT)
Dept: WOUND CARE | Facility: HOSPITAL | Age: 44
Discharge: HOME | End: 2024-04-02
Attending: NURSE PRACTITIONER
Payer: MEDICAID

## 2024-04-02 VITALS
RESPIRATION RATE: 18 BRPM | HEART RATE: 84 BPM | HEIGHT: 71 IN | DIASTOLIC BLOOD PRESSURE: 63 MMHG | WEIGHT: 203.94 LBS | BODY MASS INDEX: 28.55 KG/M2 | SYSTOLIC BLOOD PRESSURE: 150 MMHG

## 2024-04-02 DIAGNOSIS — Z78.9 PRESENCE OF SURGICAL INCISION: ICD-10-CM

## 2024-04-02 DIAGNOSIS — T81.32XA DEHISCENCE OF INTERNAL SURGICAL WOUND, INITIAL ENCOUNTER: Primary | ICD-10-CM

## 2024-04-02 DIAGNOSIS — Z98.890 HISTORY OF COSMETIC SURGERY: ICD-10-CM

## 2024-04-02 PROCEDURE — 97598 DBRDMT OPN WND ADDL 20CM/<: CPT

## 2024-04-02 PROCEDURE — 27000999 HC MEDICAL RECORD PHOTO DOCUMENTATION

## 2024-04-02 PROCEDURE — 97597 DBRDMT OPN WND 1ST 20 CM/<: CPT

## 2024-04-02 PROCEDURE — 99213 OFFICE O/P EST LOW 20 MIN: CPT | Mod: 25

## 2024-04-02 RX ORDER — MUPIROCIN 20 MG/G
OINTMENT TOPICAL DAILY
Qty: 30 G | Refills: 1 | Status: SHIPPED | OUTPATIENT
Start: 2024-04-02 | End: 2024-04-16 | Stop reason: SDUPTHER

## 2024-04-02 NOTE — PROGRESS NOTES
"  Subjective:       Patient ID: Betty Faustin is a 43 y.o. female.    Chief Complaint: Wound Care (Left arm/axilla/Right Arm/axilla/Horizontal lower abdomen/Vertical midline abdomen/Upper back/Lower back/Chest)    HPI  3/26/24 - Ms. Faustin is a 43 year old  female who is being seen for multiple surgical incisions.  These incisions are the result of cosmetic surgery procedures which occurred in [place] on 3/6/24.  She was discharged to home following surgery for an oral abx.  She does not know the name of that abx but completed it three days ago.  She has multiple incisions that are, for the most part, approximated.  She does still have many staples in place as well as a LINUS drain on each side of her lower abdomen.  She was told to "pull" the drains when there is less that 25 cc of exudate per day.  She reports that she is still significantly above that amount today.   The exudate and drains were not assessed today by this provider.  Her procedures included arm lift (Brachioplasty), Matilda de lis Abdominoplasty and a  reverse tummy tuck.  She was instructed to begin application of Betadine on all incision lines.  We will plan to debride the fibrin at her next visit as well as remove stables that appear ready.  There is a small dehisced area are the left lower abdominal quadrant.  There is also erythema at the back incision.  This may be due to friction as she is only wearing a T-shirt.  She was instructed to begin using an ABD pad over the back incision to prevent irritation.  Out of an abundance of caution, an Rx for Doxycycline, 10 days, was sent to her pharmacy.    4/2/24 - The patient returns today for f/up on the multiple surgical incisions resulting from cosmetic surgery.  The majority of the areas are progressing well.  She does still have irritation and erythema across the upper back which is likely due to friction sitting in chairs, etc.  Additionally there is one concerning area of dehiscence at " the left lower quadrant, as well as under the left axilla.  She will begin application of mupirocin on these areas and continue the doxycycline (3 days remaining).  We will reevaluate next week for possible additional abx, if indicated.  Today selective debridement was done of a large portion of the incisions, removal fibrin.  Also, multiple staples and sutures were removed.  She does still have both LINUS drains on either side of her lower abdomen and does report that she still has approximately 100 cc of exudate per day.  They are to be pulled at 25 cc per day.     Review of Systems      Objective:      Vitals:    04/02/24 1305   BP: (!) 150/63   Pulse: 84   Resp: 18       Physical Exam       Altered Skin Integrity 03/26/24 1401 Left upper Arm #1 Other (comment) (Active)   03/26/24 1401   Altered Skin Integrity Present on Admission - Did Patient arrive to the hospital with altered skin?: yes   Side: Left   Orientation: upper   Location: Arm   Wound Number: #1   Is this injury device related?: Other (see comments)   Primary Wound Type: Other   Description of Altered Skin Integrity:    Ankle-Brachial Index:    Pulses:    Removal Indication and Assessment:    Wound Outcome:    (Retired) Wound Length (cm):    (Retired) Wound Width (cm):    (Retired) Depth (cm):    Wound Description (Comments):    Removal Indications:    Dressing Appearance Open to air 04/02/24 1306   Drainage Amount None 04/02/24 1306   Appearance Pink;Dry;Other (see comments);Staples intact 04/02/24 1306   Periwound Area Dry 04/02/24 1306   Wound Edges Approximated 04/02/24 1306   Wound Length (cm) 36 cm 04/02/24 1306   Wound Width (cm) 0.2 cm 04/02/24 1306   Wound Depth (cm) 0.2 cm 04/02/24 1306   Wound Volume (cm^3) 1.44 cm^3 04/02/24 1306   Wound Surface Area (cm^2) 7.2 cm^2 04/02/24 1306   Care Cleansed with:;Wound cleanser 04/02/24 1306   Dressing Applied 04/02/24 1306            Altered Skin Integrity 03/26/24 1404 Right upper Arm #2 Other  (comment) (Active)   03/26/24 1404   Altered Skin Integrity Present on Admission - Did Patient arrive to the hospital with altered skin?: yes   Side: Right   Orientation: upper   Location: Arm   Wound Number: #2   Is this injury device related?: Other (see comments)   Primary Wound Type: Other   Description of Altered Skin Integrity:    Ankle-Brachial Index:    Pulses:    Removal Indication and Assessment:    Wound Outcome:    (Retired) Wound Length (cm):    (Retired) Wound Width (cm):    (Retired) Depth (cm):    Wound Description (Comments):    Removal Indications:    Dressing Appearance Open to air 04/02/24 1306   Drainage Amount None 04/02/24 1306   Appearance Red;Dry;Other (see comments);Staples intact 04/02/24 1306   Periwound Area Dry 04/02/24 1306   Wound Edges Approximated 04/02/24 1306   Wound Length (cm) 36 cm 04/02/24 1306   Wound Width (cm) 0.2 cm 04/02/24 1306   Wound Depth (cm) 0.2 cm 04/02/24 1306   Wound Volume (cm^3) 1.44 cm^3 04/02/24 1306   Wound Surface Area (cm^2) 7.2 cm^2 04/02/24 1306   Care Cleansed with:;Wound cleanser 04/02/24 1306   Dressing Applied 04/02/24 1306            Altered Skin Integrity 03/26/24 1421  lower Abdomen #3 (Active)   03/26/24 1421   Altered Skin Integrity Present on Admission - Did Patient arrive to the hospital with altered skin?: yes   Side:    Orientation: lower   Location: Abdomen   Wound Number: #3   Is this injury device related?: No   Primary Wound Type:    Description of Altered Skin Integrity:    Ankle-Brachial Index:    Pulses:    Removal Indication and Assessment:    Wound Outcome:    (Retired) Wound Length (cm):    (Retired) Wound Width (cm):    (Retired) Depth (cm):    Wound Description (Comments):    Removal Indications:    Dressing Appearance Moist drainage 04/02/24 1306   Drainage Amount Moderate 04/02/24 1306   Drainage Characteristics/Odor Serosanguineous 04/02/24 1306   Appearance [place];Moist;Staples intact 04/02/24 1306   Tissue loss description  Full thickness 04/02/24 1306   Periwound Area Dry 04/02/24 1306   Wound Edges Open 04/02/24 1306   Wound Length (cm) 1.3 cm 04/02/24 1306   Wound Width (cm) 64 cm 04/02/24 1306   Wound Depth (cm) 0.5 cm 04/02/24 1306   Wound Volume (cm^3) 41.6 cm^3 04/02/24 1306   Wound Surface Area (cm^2) 83.2 cm^2 04/02/24 1306   Care Cleansed with:;Wound cleanser 04/02/24 1306   Dressing Applied 04/02/24 1306            Altered Skin Integrity 03/26/24 1423 midline Abdomen #4 (Active)   03/26/24 1423   Altered Skin Integrity Present on Admission - Did Patient arrive to the hospital with altered skin?: yes   Side:    Orientation: midline   Location: Abdomen   Wound Number: #4   Is this injury device related?: Other (see comments)   Primary Wound Type:    Description of Altered Skin Integrity:    Ankle-Brachial Index:    Pulses:    Removal Indication and Assessment:    Wound Outcome:    (Retired) Wound Length (cm):    (Retired) Wound Width (cm):    (Retired) Depth (cm):    Wound Description (Comments):    Removal Indications:    Dressing Appearance Open to air 04/02/24 1306   Drainage Amount None 04/02/24 1306   Appearance Red;Dry;Other (see comments);Staples intact 04/02/24 1306   Periwound Area Dry 04/02/24 1306   Wound Edges Approximated 04/02/24 1306   Wound Length (cm) 29 cm 04/02/24 1306   Wound Width (cm) 0.2 cm 04/02/24 1306   Wound Depth (cm) 0.2 cm 04/02/24 1306   Wound Volume (cm^3) 1.16 cm^3 04/02/24 1306   Wound Surface Area (cm^2) 5.8 cm^2 04/02/24 1306   Care Cleansed with:;Wound cleanser 04/02/24 1306   Dressing Applied 04/02/24 1306            Altered Skin Integrity 03/26/24 1425 upper Chest #5 (Active)   03/26/24 1425   Altered Skin Integrity Present on Admission - Did Patient arrive to the hospital with altered skin?: yes   Side:    Orientation: upper   Location: Chest   Wound Number: #5   Is this injury device related?: Other (see comments)   Primary Wound Type:    Description of Altered Skin Integrity:     Ankle-Brachial Index:    Pulses:    Removal Indication and Assessment:    Wound Outcome:    (Retired) Wound Length (cm):    (Retired) Wound Width (cm):    (Retired) Depth (cm):    Wound Description (Comments):    Removal Indications:    Dressing Appearance Open to air 04/02/24 1306   Drainage Amount None 04/02/24 1306   Appearance Red;Dry;Other (see comments);Staples intact 04/02/24 1306   Periwound Area Dry 04/02/24 1306   Wound Edges Approximated 04/02/24 1306   Wound Length (cm) 47 cm 04/02/24 1306   Wound Width (cm) 0.3 cm 04/02/24 1306   Wound Depth (cm) 0.2 cm 04/02/24 1306   Wound Volume (cm^3) 2.82 cm^3 04/02/24 1306   Wound Surface Area (cm^2) 14.1 cm^2 04/02/24 1306   Care Cleansed with:;Wound cleanser 04/02/24 1306   Dressing Applied 04/02/24 1306            Altered Skin Integrity 04/03/24 0946 upper Back #6 (Active)   04/03/24 0946   Altered Skin Integrity Present on Admission - Did Patient arrive to the hospital with altered skin?: yes   Side:    Orientation: upper   Location: Back   Wound Number: #6   Is this injury device related?:    Primary Wound Type:    Description of Altered Skin Integrity:    Ankle-Brachial Index:    Pulses:    Removal Indication and Assessment:    Wound Outcome:    (Retired) Wound Length (cm):    (Retired) Wound Width (cm):    (Retired) Depth (cm):    Wound Description (Comments):    Removal Indications:    Dressing Appearance Open to air 04/03/24 0947   Drainage Amount Scant 04/03/24 0947   Drainage Characteristics/Odor Serosanguineous 04/03/24 0947   Appearance Red;Dry;Other (see comments) 04/03/24 0947   Periwound Area Dry 04/03/24 0947   Wound Edges Approximated 04/03/24 0947   Wound Length (cm) 0.3 cm 04/03/24 0947   Wound Width (cm) 47 cm 04/03/24 0947   Wound Depth (cm) 0.2 cm 04/03/24 0947   Wound Volume (cm^3) 2.82 cm^3 04/03/24 0947   Wound Surface Area (cm^2) 14.1 cm^2 04/03/24 0947   Care Cleansed with:;Wound cleanser 04/03/24 0947   Dressing Applied 04/03/24 0947    Dressing Change Due 04/04/24 04/03/24 0947            Altered Skin Integrity 04/03/24 0946 lower Back #7 (Active)   04/03/24 0946   Altered Skin Integrity Present on Admission - Did Patient arrive to the hospital with altered skin?: yes   Side:    Orientation: lower   Location: Back   Wound Number: #7   Is this injury device related?:    Primary Wound Type:    Description of Altered Skin Integrity:    Ankle-Brachial Index:    Pulses:    Removal Indication and Assessment:    Wound Outcome:    (Retired) Wound Length (cm):    (Retired) Wound Width (cm):    (Retired) Depth (cm):    Wound Description (Comments):    Removal Indications:    Dressing Appearance Open to air 04/03/24 0948   Drainage Amount None 04/03/24 0948   Appearance Red;Dry;Other (see comments) 04/03/24 0948   Periwound Area Dry 04/03/24 0948   Wound Edges Approximated 04/03/24 0948   Wound Length (cm) 0.2 cm 04/03/24 0948   Wound Width (cm) 64 cm 04/03/24 0948   Wound Depth (cm) 0.2 cm 04/03/24 0948   Wound Volume (cm^3) 2.56 cm^3 04/03/24 0948   Wound Surface Area (cm^2) 12.8 cm^2 04/03/24 0948   Care Cleansed with:;Wound cleanser 04/03/24 0948   Dressing Applied 04/03/24 0948   Dressing Change Due 04/04/24 04/03/24 0948         Assessment:         ICD-10-CM ICD-9-CM   1. Dehiscence of internal surgical wound, initial encounter  T81.32XA 998.31   2. Presence of surgical incision  Z78.9 V49.89   3. History of cosmetic surgery  Z98.890 V45.89             Procedures:       Left arm/Axilla                                              Right arm/axilla  Betadine                 Right lower abdomen/Hips  Betadine       Abdominal midline  Betadine               Right anterior chest                                    Left lateral upper chest                             Upper back                                                  Right lateral upper chest                              Left anterior upper chest                              TR    4/2/24         "  Left arm/axilla (pre)                                      Right arm/axilla (pre)                                   Chest (pre)               Upper back (pre)                                          Lower back (pre)                                         Midline abd (pre)                                         Lower horizontal abd (pre)               Lower horizontal abd (post)                         Midline abd (post)                                        Chest (post)                                                Back (post)  (Betadine to all areas)         Left arm/axilla (post)                                    Right arm/axilla (post)  (Betadine to all areas)      Debridement     Date/Time: 4/2/2024 12:45 PM     Performed by: Amalia Holloway NP  Authorized by: Amalia Holloway NP    Time out: Immediately prior to procedure a "time out" was called to verify the correct patient, procedure, equipment, support staff and site/side marked as required.     Consent Done?:  Yes (Verbal)     Preparation: Patient was prepped and draped with clean technique    Local anesthesia used?: No       Wound Details:    Location:  Left arm (and axilla)    Type of Debridement:  Non-excisional       Length (cm):  36       Area (sq cm):  7.2       Width (cm):  0.2       Percent Debrided (%):  100       Depth (cm):  0.2       Total Area Debrided (sq cm):  7.2    Depth of debridement:  Epidermis/Dermis    Devitalized tissue debrided:  Exudate and Fibrin    Instruments:  Forceps, Scissors and Curette (Dermal)  Bleeding:  None     2nd Wound Details:     Location:  Right arm (and axilla)    Type of Debridement:  Non-excisional       Length (cm):  36       Area (sq cm):  7.2       Width (cm):  0.2       Percent Debrided (%):  100       Depth (cm):  0.2       Total Area Debrided (sq cm):  7.2    Depth of debridement:  Epidermis/Dermis    Devitalized tissue debrided:  Biofilm, Fibrin and Exudate    Instruments:  Forceps, Scissors " and Curette (Curette)     3rd Wound Details:     Debridement - 3rd Wound - General Location: lower abdomen.    Type of Debridement:  Non-excisional       Length (cm):  1.3       Area (sq cm):  83.2       Width (cm):  64       Percent Debrided (%):  60       Depth (cm):  0.5       Total Area Debrided (sq cm):  49.92    Depth of debridement:  Epidermis/Dermis    Devitalized tissue debrided:  Exudate, Fibrin and Slough    Instruments:  Forceps, Scissors and Curette (Dermal)     4th Wound Details:     Debridement - 4th Wound - General Location: midline abdomen.    Type of Debridement:  Non-excisional       Length (cm):  29       Area (sq cm):  5.8       Width (cm):  0.2       Percent Debrided (%):  100       Depth (cm):  0.2       Total Area Debrided (sq cm):  5.8    Depth of debridement:  Epidermis/Dermis    Devitalized tissue debrided:  Exudate and Fibrin    Instruments:  Forceps, Scissors and Curette (Dermal)     5th Wound Details:     Location:  Back (upper)    Type of Debridement:  Non-excisional       Length (cm):  0.3       Area (sq cm):  14.1       Width (cm):  47       Percent Debrided (%):  100       Depth (cm):  0.2       Total Area Debrided (sq cm):  14.1    Depth of debridement:  Epidermis/Dermis    Devitalized tissue debrided:  Exudate, Fibrin and Slough    Instruments:  Forceps, Scissors and Curette (Dermal)  Patient tolerance:  Patient tolerated the procedure well with no immediate complications    Suture removal x 10  Staple removal x 10    [] Yes [] No   I & D performed  [] Yes [] No   Excisional debridement performed  [x] Yes [] No   Selective debridement performed           [] Yes [] No   Mechanical debridement performed         [] Yes [] No  Silver nitrate applied                                     [] Yes [] No  Labs ordered this visit                                  [] Yes [] No   Imaging ordered this visit                           [] Yes [] No   Tissue pathology and/or culture taken      "        MEDICATIONS    Current Outpatient Medications:     calcium carbonate 195 mg calcium (500 mg) Chew, Take by mouth., Disp: , Rfl:     doxycycline (VIBRA-TABS) 100 MG tablet, Take 1 tablet (100 mg total) by mouth 2 (two) times daily. for 10 days, Disp: 20 tablet, Rfl: 0    ferrous sulfate 27 mg iron Tab, Take by mouth., Disp: , Rfl:     mecobalamin, vitamin B12, 1,000 mcg Chew, Take by mouth., Disp: , Rfl:     pantoprazole (PROTONIX) 40 MG tablet, Take 40 mg by mouth every evening. , Disp: , Rfl: 11    tranexamic acid (LYSTEDA) 650 mg tablet, SMARTSI-2 Tablet(s) By Mouth Every 12 Hours, Disp: , Rfl:     mupirocin (BACTROBAN) 2 % ointment, Apply topically once daily., Disp: 30 g, Rfl: 1   Review of patient's allergies indicates:   Allergen Reactions    Penicillins      At age 18 years , had tonsils removed and had Penicillin   Had a rash       DIAGNOSTICS      Labs/Scans/Micro:    CBC:   Lab Results   Component Value Date    WBC 8.2 2019    HGB 10.0 (L) 2019    HCT 31.6 (L) 2019    MCV 83.8 2019     (H) 2019       Sedimentation rate: No results found for: "SEDRATE" C-reactive protein: No results found for: "CRP" Chemistry: [unfilled]  HBA1C: No components found for: "HBA1C"    Ankle Brachial Index: No results found for this or any previous visit.      Imaging:    Plain film: No results found for this or any previous visit.    MRI: No results found for this or any previous visit.   No results found for this or any previous visit.    Bone Scan: No results found for this or any previous visit.   No results found for this or any previous visit.        Microbiology: Rx for Doxy sent to pharmacy    HOME HEALTH AGENCY:  NONE  TIMES PER WEEK/DAYS:    WOUND CARE ORDERS: Cleanse all incisions with dial soap and water and let air dry for 15-20 min, apply betadine solution to all areas daily. Once betadine is dry, apply Mupirocin to upper back incision and left axilla only, cover " upper back incision with ABD pad for protection. Leave medipore tape to lower abdomen in place, if it comes off, reapply tape or steristrip/bandaid to keep edges of wound approximated    Follow up in 1 week (on 4/9/2024) for stretcher.

## 2024-04-03 RX ORDER — MUPIROCIN 20 MG/G
OINTMENT TOPICAL DAILY
Qty: 30 G | Refills: 1 | Status: SHIPPED | OUTPATIENT
Start: 2024-04-03

## 2024-04-03 NOTE — PROCEDURES
"Debridement    Date/Time: 4/2/2024 12:45 PM    Performed by: Amalia Holloway NP  Authorized by: Amalia Holloway NP    Time out: Immediately prior to procedure a "time out" was called to verify the correct patient, procedure, equipment, support staff and site/side marked as required.    Consent Done?:  Yes (Verbal)    Preparation: Patient was prepped and draped with clean technique    Local anesthesia used?: No      Wound Details:    Location:  Left arm (and axilla)    Type of Debridement:  Non-excisional       Length (cm):  36       Area (sq cm):  7.2       Width (cm):  0.2       Percent Debrided (%):  100       Depth (cm):  0.2       Total Area Debrided (sq cm):  7.2    Depth of debridement:  Epidermis/Dermis    Devitalized tissue debrided:  Exudate and Fibrin    Instruments:  Forceps, Scissors and Curette (Dermal)  Bleeding:  None    2nd Wound Details:     Location:  Right arm (and axilla)    Type of Debridement:  Non-excisional       Length (cm):  36       Area (sq cm):  7.2       Width (cm):  0.2       Percent Debrided (%):  100       Depth (cm):  0.2       Total Area Debrided (sq cm):  7.2    Depth of debridement:  Epidermis/Dermis    Devitalized tissue debrided:  Biofilm, Fibrin and Exudate    Instruments:  Forceps, Scissors and Curette (Curette)    3rd Wound Details:     Debridement - 3rd Wound - General Location: lower abdomen.    Type of Debridement:  Non-excisional       Length (cm):  1.3       Area (sq cm):  83.2       Width (cm):  64       Percent Debrided (%):  60       Depth (cm):  0.5       Total Area Debrided (sq cm):  49.92    Depth of debridement:  Epidermis/Dermis    Devitalized tissue debrided:  Exudate, Fibrin and Slough    Instruments:  Forceps, Scissors and Curette (Dermal)    4th Wound Details:     Debridement - 4th Wound - General Location: midline abdomen.    Type of Debridement:  Non-excisional       Length (cm):  29       Area (sq cm):  5.8       Width (cm):  0.2       Percent " Debrided (%):  100       Depth (cm):  0.2       Total Area Debrided (sq cm):  5.8    Depth of debridement:  Epidermis/Dermis    Devitalized tissue debrided:  Exudate and Fibrin    Instruments:  Forceps, Scissors and Curette (Dermal)    5th Wound Details:     Location:  Back (upper)    Type of Debridement:  Non-excisional       Length (cm):  0.3       Area (sq cm):  14.1       Width (cm):  47       Percent Debrided (%):  100       Depth (cm):  0.2       Total Area Debrided (sq cm):  14.1    Depth of debridement:  Epidermis/Dermis    Devitalized tissue debrided:  Exudate, Fibrin and Slough    Instruments:  Forceps, Scissors and Curette (Dermal)  Patient tolerance:  Patient tolerated the procedure well with no immediate complications

## 2024-04-09 ENCOUNTER — HOSPITAL ENCOUNTER (OUTPATIENT)
Dept: WOUND CARE | Facility: HOSPITAL | Age: 44
Discharge: HOME | End: 2024-04-09
Attending: FAMILY MEDICINE
Payer: MEDICAID

## 2024-04-09 VITALS
RESPIRATION RATE: 18 BRPM | SYSTOLIC BLOOD PRESSURE: 144 MMHG | HEIGHT: 71 IN | HEART RATE: 86 BPM | BODY MASS INDEX: 28.55 KG/M2 | WEIGHT: 203.94 LBS | DIASTOLIC BLOOD PRESSURE: 67 MMHG

## 2024-04-09 DIAGNOSIS — Z98.890 HISTORY OF COSMETIC SURGERY: ICD-10-CM

## 2024-04-09 DIAGNOSIS — T81.32XD DEHISCENCE OF INTERNAL SURGICAL INCISION, SUBSEQUENT ENCOUNTER: Primary | ICD-10-CM

## 2024-04-09 PROBLEM — T81.329D: Status: ACTIVE | Noted: 2024-03-26

## 2024-04-09 PROCEDURE — 27000999 HC MEDICAL RECORD PHOTO DOCUMENTATION

## 2024-04-09 PROCEDURE — 17250 CHEM CAUT OF GRANLTJ TISSUE: CPT

## 2024-04-09 PROCEDURE — 99213 OFFICE O/P EST LOW 20 MIN: CPT | Mod: 25

## 2024-04-09 NOTE — PROGRESS NOTES
"NOTES:  Nothing new at this time  Subjective:       Patient ID: Betty Faustin is a 43 y.o. female.    Chief Complaint: Wound Care ((Left arm/axilla/Right Arm/axilla/Horizontal lower abdomen/Vertical midline abdomen/Upper back/Lower back/Chest))    HPI  3/26/24 - Ms. Faustin is a 43 year old  female who is being seen for multiple surgical incisions.  These incisions are the result of cosmetic surgery procedures which occurred in [place] on 3/6/24.  She was discharged to home following surgery for an oral abx.  She does not know the name of that abx but completed it three days ago.  She has multiple incisions that are, for the most part, approximated.  She does still have many staples in place as well as a LINUS drain on each side of her lower abdomen.  She was told to "pull" the drains when there is less that 25 cc of exudate per day.  She reports that she is still significantly above that amount today.   The exudate and drains were not assessed today by this provider.  Her procedures included arm lift (Brachioplasty), Matilda de lis Abdominoplasty and a  reverse tummy tuck.  She was instructed to begin application of Betadine on all incision lines.  We will plan to debride the fibrin at her next visit as well as remove stables that appear ready.  There is a small dehisced area are the left lower abdominal quadrant.  There is also erythema at the back incision.  This may be due to friction as she is only wearing a T-shirt.  She was instructed to begin using an ABD pad over the back incision to prevent irritation.  Out of an abundance of caution, an Rx for Doxycycline, 10 days, was sent to her pharmacy.    4/2/24 - The patient returns today for f/up on the multiple surgical incisions resulting from cosmetic surgery.  The majority of the areas are progressing well.  She does still have irritation and erythema across the upper back which is likely due to friction sitting in chairs, etc.  Additionally there is one " concerning area of dehiscence at the left lower quadrant, as well as under the left axilla.  She will begin application of mupirocin on these areas and continue the doxycycline (3 days remaining).  We will reevaluate next week for possible additional abx, if indicated.  Today selective debridement was done of a large portion of the incisions, removal fibrin.  Also, multiple staples and sutures were removed.  She does still have both LINUS drains on either side of her lower abdomen and does report that she still has approximately 100 cc of exudate per day.  They are to be pulled at 25 cc per day.     April 9, 2024:  43-year-old white female, who is here for follow up of dehiscence wounds, secondary to cosmetic surgery for removal of skin, done in [place] last month.  Most of the wounds are healing nicely, there still the open wound on the left lower quadrant abdominal area, but does not appear infected.  I do not see wounds which need to be surgically debrided today.  At least a couple of these wounds can be closed.  Review of Systems   Constitutional: Negative.    Respiratory: Negative.     Cardiovascular: Negative.    Skin:         As documented in the HPI.   All other systems reviewed and are negative.        Objective:      Vitals:    04/09/24 1259   BP: (!) 144/67   Pulse: 86   Resp: 18       Physical Exam  Vitals and nursing note reviewed.   Constitutional:       Appearance: Normal appearance.   Cardiovascular:      Rate and Rhythm: Normal rate.   Pulmonary:      Effort: Pulmonary effort is normal.   Skin:     General: Skin is warm and dry.      Comments: The wounds on the back, chest, lower back, axillae, are healing nicely, mostly healed.  None of the needed debridement.  The LINUS drains are still present, still draining over 25 cc a day.  The left lower abdominal wall wound is open, and revealing some fibrin, but no slough.  I did only a mechanical debridement.  The area is  .   Neurological:       Mental Status: She is alert.            Altered Skin Integrity 03/26/24 1401 Left upper Arm #1 Other (comment) (Active)   03/26/24 1401   Altered Skin Integrity Present on Admission - Did Patient arrive to the hospital with altered skin?: yes   Side: Left   Orientation: upper   Location: Arm   Wound Number: #1   Is this injury device related?: Other (see comments)   Primary Wound Type: Other   Description of Altered Skin Integrity:    Ankle-Brachial Index:    Pulses:    Removal Indication and Assessment:    Wound Outcome:    (Retired) Wound Length (cm):    (Retired) Wound Width (cm):    (Retired) Depth (cm):    Wound Description (Comments):    Removal Indications:    Dressing Appearance Open to air 04/09/24 1302   Drainage Amount None 04/09/24 1302   Appearance Pink;Moist 04/09/24 1302   Tissue loss description Full thickness 04/09/24 1302   Periwound Area Dry 04/09/24 1302   Wound Edges Defined 04/09/24 1302   Wound Length (cm) 2.5 cm 04/09/24 1302   Wound Width (cm) 0.3 cm 04/09/24 1302   Wound Depth (cm) 0.2 cm 04/09/24 1302   Wound Volume (cm^3) 0.15 cm^3 04/09/24 1302   Wound Surface Area (cm^2) 0.75 cm^2 04/09/24 1302   Care Cleansed with: 04/09/24 1302   Dressing Applied 04/09/24 1302            Altered Skin Integrity 03/26/24 1421  lower Abdomen #3 (Active)   03/26/24 1421   Altered Skin Integrity Present on Admission - Did Patient arrive to the hospital with altered skin?: yes   Side:    Orientation: lower   Location: Abdomen   Wound Number: #3   Is this injury device related?: No   Primary Wound Type:    Description of Altered Skin Integrity:    Ankle-Brachial Index:    Pulses:    Removal Indication and Assessment:    Wound Outcome:    (Retired) Wound Length (cm):    (Retired) Wound Width (cm):    (Retired) Depth (cm):    Wound Description (Comments):    Removal Indications:    Dressing Appearance Moist drainage 04/09/24 1302   Drainage Amount Moderate 04/09/24 1302   Drainage Characteristics/Odor  Serosanguineous 04/09/24 1302   Appearance Pink;Moist 04/09/24 1302   Tissue loss description Full thickness 04/09/24 1302   Periwound Area Dry;Intact 04/09/24 1302   Wound Edges Defined 04/09/24 1302   Wound Length (cm) 0.8 cm 04/09/24 1302   Wound Width (cm) 6 cm 04/09/24 1302   Wound Depth (cm) 0.7 cm 04/09/24 1302   Wound Volume (cm^3) 3.36 cm^3 04/09/24 1302   Wound Surface Area (cm^2) 4.8 cm^2 04/09/24 1302   Undermining (depth (cm)/location) 2.4 cm from 10-2 oclock 04/09/24 1302   Care Cleansed with:;Wound cleanser 04/09/24 1302   Dressing Applied 04/09/24 1302   Dressing Change Due 04/10/24 04/09/24 1302            Altered Skin Integrity 03/26/24 1425 upper Chest #5 (Active)   03/26/24 1425   Altered Skin Integrity Present on Admission - Did Patient arrive to the hospital with altered skin?: yes   Side:    Orientation: upper   Location: Chest   Wound Number: #5   Is this injury device related?: Other (see comments)   Primary Wound Type:    Description of Altered Skin Integrity:    Ankle-Brachial Index:    Pulses:    Removal Indication and Assessment:    Wound Outcome:    (Retired) Wound Length (cm):    (Retired) Wound Width (cm):    (Retired) Depth (cm):    Wound Description (Comments):    Removal Indications:    Dressing Appearance Open to air 04/09/24 1302   Drainage Amount None 04/09/24 1302   Appearance Pink;Moist 04/09/24 1302   Tissue loss description Full thickness 04/09/24 1302   Periwound Area Intact;Dry;[place] 04/09/24 1302   Wound Edges Defined 04/09/24 1302   Wound Length (cm) 0.3 cm 04/09/24 1302   Wound Width (cm) 3 cm 04/09/24 1302   Wound Depth (cm) 0.2 cm 04/09/24 1302   Wound Volume (cm^3) 0.18 cm^3 04/09/24 1302   Wound Surface Area (cm^2) 0.9 cm^2 04/09/24 1302   Care Cleansed with:;Wound cleanser 04/09/24 1302   Dressing Applied 04/09/24 1302            Altered Skin Integrity 04/03/24 0946 upper Back #6 (Active)   04/03/24 0946   Altered Skin Integrity Present on Admission - Did Patient  arrive to the hospital with altered skin?: yes   Side:    Orientation: upper   Location: Back   Wound Number: #6   Is this injury device related?:    Primary Wound Type:    Description of Altered Skin Integrity:    Ankle-Brachial Index:    Pulses:    Removal Indication and Assessment:    Wound Outcome:    (Retired) Wound Length (cm):    (Retired) Wound Width (cm):    (Retired) Depth (cm):    Wound Description (Comments):    Removal Indications:    Dressing Appearance Open to air 04/09/24 1302   Drainage Amount None 04/09/24 1302   Appearance Red;Dry 04/09/24 1302   Tissue loss description Full thickness 04/09/24 1302   Periwound Area Dry 04/09/24 1302   Wound Edges Defined 04/09/24 1302   Wound Length (cm) 0.5 cm 04/09/24 1302   Wound Width (cm) 38 cm 04/09/24 1302   Wound Depth (cm) 0.2 cm 04/09/24 1302   Wound Volume (cm^3) 3.8 cm^3 04/09/24 1302   Wound Surface Area (cm^2) 19 cm^2 04/09/24 1302   Care Cleansed with:;Wound cleanser 04/09/24 1302   Dressing Applied 04/09/24 1302       [REMOVED]      Altered Skin Integrity 03/26/24 1404 Right upper Arm #2 Other (comment) (Removed)   03/26/24 1404   Altered Skin Integrity Present on Admission - Did Patient arrive to the hospital with altered skin?: yes   Side: Right   Orientation: upper   Location: Arm   Wound Number: #2   Is this injury device related?: Other (see comments)   Primary Wound Type: Other   Description of Altered Skin Integrity:    Ankle-Brachial Index:    Pulses:    Removal Indication and Assessment:    Wound Outcome: Healed   (Retired) Wound Length (cm):    (Retired) Wound Width (cm):    (Retired) Depth (cm):    Wound Description (Comments):    Removal Indications:    Removed 04/09/24 1315   Dressing Appearance Open to air 04/09/24 1302   Drainage Amount None 04/09/24 1302   Appearance Pink;Dry 04/09/24 1302   Periwound Area Dry;Intact;[place] 04/09/24 1302   Wound Length (cm) 0 cm 04/09/24 1302   Wound Width (cm) 0 cm 04/09/24 1302   Wound Depth (cm) 0  cm 04/09/24 1302   Wound Volume (cm^3) 0 cm^3 04/09/24 1302   Wound Surface Area (cm^2) 0 cm^2 04/09/24 1302   Care Cleansed with:;Wound cleanser 04/09/24 1302   Dressing Applied 04/09/24 1302       [REMOVED]      Altered Skin Integrity 03/26/24 1423 midline Abdomen #4 (Removed)   03/26/24 1423   Altered Skin Integrity Present on Admission - Did Patient arrive to the hospital with altered skin?: yes   Side:    Orientation: midline   Location: Abdomen   Wound Number: #4   Is this injury device related?: Other (see comments)   Primary Wound Type:    Description of Altered Skin Integrity:    Ankle-Brachial Index:    Pulses:    Removal Indication and Assessment:    Wound Outcome: Healed   (Retired) Wound Length (cm):    (Retired) Wound Width (cm):    (Retired) Depth (cm):    Wound Description (Comments):    Removal Indications:    Removed 04/09/24 1317   Dressing Appearance Open to air 04/09/24 1302   Drainage Amount None 04/09/24 1302   Appearance Pink;Intact;Dry 04/09/24 1302   Periwound Area Intact;Dry;[place] 04/09/24 1302   Wound Length (cm) 0 cm 04/09/24 1302   Wound Width (cm) 0 cm 04/09/24 1302   Wound Depth (cm) 0 cm 04/09/24 1302   Wound Volume (cm^3) 0 cm^3 04/09/24 1302   Wound Surface Area (cm^2) 0 cm^2 04/09/24 1302   Care Cleansed with:;Wound cleanser 04/09/24 1302   Dressing Applied 04/09/24 1302       [REMOVED]      Altered Skin Integrity 04/03/24 0946 lower Back #7 (Removed)   04/03/24 0946   Altered Skin Integrity Present on Admission - Did Patient arrive to the hospital with altered skin?: yes   Side:    Orientation: lower   Location: Back   Wound Number: #7   Is this injury device related?:    Primary Wound Type:    Description of Altered Skin Integrity:    Ankle-Brachial Index:    Pulses:    Removal Indication and Assessment:    Wound Outcome: Healed   (Retired) Wound Length (cm):    (Retired) Wound Width (cm):    (Retired) Depth (cm):    Wound Description (Comments):    Removal Indications:     Removed 04/09/24 1309   Dressing Appearance Open to air 04/09/24 1302   Drainage Amount None 04/09/24 1302   Appearance Pink;Dry 04/09/24 1302   Periwound Area Dry;Intact;[place] 04/09/24 1302   Wound Length (cm) 0 cm 04/09/24 1302   Wound Width (cm) 0 cm 04/09/24 1302   Wound Depth (cm) 0 cm 04/09/24 1302   Wound Volume (cm^3) 0 cm^3 04/09/24 1302   Wound Surface Area (cm^2) 0 cm^2 04/09/24 1302   Care Cleansed with:;Wound cleanser 04/09/24 1302   Dressing Applied 04/09/24 1302         Assessment:         ICD-10-CM ICD-9-CM   1. Dehiscence of internal surgical incision, subsequent encounter  T81.32XD V58.89     998.31   2. History of cosmetic surgery  Z98.890 V45.89             Procedures:       Left arm/Axilla                                              Right arm/axilla  Betadine                 Right lower abdomen/Hips  Betadine       Abdominal midline  Betadine               Right anterior chest                                    Left lateral upper chest                             Upper back                                                  Right lateral upper chest                              Left anterior upper chest                              TR    4/2/24          Left arm/axilla (pre)                                      Right arm/axilla (pre)                                   Chest (pre)               Upper back (pre)                                          Lower back (pre)                                         Midline abd (pre)                                         Lower horizontal abd (pre)               Lower horizontal abd (post)                         Midline abd (post)                                        Chest (post)                                                Back (post)  (Betadine to all areas)         Left arm/axilla (post)                                    Right arm/axilla (post)  (Betadine to all areas)    4/9/24          Right arm (pre)                                              Left arm (pre)                                             Back (pre)  (Betadine)                                                     (betadine)                                                   (betadine)             Lower abdomen (pre)                                  Left lower abdomen (pre)                             Midline abdomen (pre)                               Chest (pre)  (Betadine)                                                   (silver alginate, island dressing)                   (betadine)                                                    (betadine)      Chemical: left lower abdomen - silver nitrate      [] Yes [] No   I & D performed  [] Yes [] No   Excisional debridement performed  [] Yes [] No   Selective debridement performed           [x] Yes [] No   Mechanical debridement performed         [] Yes [] No  Silver nitrate applied                                     [] Yes [] No  Labs ordered this visit                                  [] Yes [] No   Imaging ordered this visit                           [] Yes [] No   Tissue pathology and/or culture taken             MEDICATIONS    Current Outpatient Medications:     calcium carbonate 195 mg calcium (500 mg) Chew, Take by mouth., Disp: , Rfl:     ferrous sulfate 27 mg iron Tab, Take by mouth., Disp: , Rfl:     mecobalamin, vitamin B12, 1,000 mcg Chew, Take by mouth., Disp: , Rfl:     mupirocin (BACTROBAN) 2 % ointment, Apply topically once daily., Disp: 30 g, Rfl: 1    mupirocin (BACTROBAN) 2 % ointment, Apply topically once daily., Disp: 30 g, Rfl: 1    pantoprazole (PROTONIX) 40 MG tablet, Take 40 mg by mouth every evening. , Disp: , Rfl: 11    tranexamic acid (LYSTEDA) 650 mg tablet, SMARTSI-2 Tablet(s) By Mouth Every 12 Hours, Disp: , Rfl:    Review of patient's allergies indicates:   Allergen Reactions    Penicillins      At age 18 years , had tonsils removed and had Penicillin   Had a rash       DIAGNOSTICS      Labs/Scans/Micro:    CBC:  "  Lab Results   Component Value Date    WBC 8.2 06/07/2019    HGB 10.0 (L) 06/07/2019    HCT 31.6 (L) 06/07/2019    MCV 83.8 06/07/2019     (H) 06/07/2019       Sedimentation rate: No results found for: "SEDRATE" C-reactive protein: No results found for: "CRP" Chemistry: [unfilled]  HBA1C: No components found for: "HBA1C"    Ankle Brachial Index: No results found for this or any previous visit.      Imaging:    Plain film: No results found for this or any previous visit.    MRI: No results found for this or any previous visit.   No results found for this or any previous visit.    Bone Scan: No results found for this or any previous visit.   No results found for this or any previous visit.        Microbiology:     HOME HEALTH AGENCY:  NONE  TIMES PER WEEK/DAYS:    WOUND CARE ORDERS: Cleanse all incisions with dial soap and water and let air dry for 15-20 min, apply betadine solution to all areas daily. Once betadine is dry, apply Mupirocin to upper back incision and left axilla only, cover upper back incision with ABD pad for protection. Leave medipore tape to lower abdomen in place, if it comes off, reapply tape or steristrip/bandaid to keep edges of wound approximated  Left lower abdomen: cleanse with dial soap and water, let air dry, place silver alginate in wound bed, cover with island dressing, change daily      Follow up in 1 week (on 4/16/2024) for stretcher.       "

## 2024-04-16 ENCOUNTER — HOSPITAL ENCOUNTER (OUTPATIENT)
Dept: WOUND CARE | Facility: HOSPITAL | Age: 44
Discharge: HOME | End: 2024-04-16
Attending: NURSE PRACTITIONER
Payer: MEDICAID

## 2024-04-16 VITALS
DIASTOLIC BLOOD PRESSURE: 74 MMHG | HEIGHT: 71 IN | RESPIRATION RATE: 17 BRPM | HEART RATE: 84 BPM | WEIGHT: 203.94 LBS | BODY MASS INDEX: 28.55 KG/M2 | SYSTOLIC BLOOD PRESSURE: 130 MMHG

## 2024-04-16 DIAGNOSIS — T81.32XD DEHISCENCE OF INTERNAL SURGICAL INCISION, SUBSEQUENT ENCOUNTER: Primary | ICD-10-CM

## 2024-04-16 DIAGNOSIS — Z78.9 PRESENCE OF SURGICAL INCISION: ICD-10-CM

## 2024-04-16 DIAGNOSIS — Z98.890 HISTORY OF COSMETIC SURGERY: ICD-10-CM

## 2024-04-16 PROCEDURE — 27000999 HC MEDICAL RECORD PHOTO DOCUMENTATION

## 2024-04-16 PROCEDURE — 99212 OFFICE O/P EST SF 10 MIN: CPT

## 2024-04-16 PROCEDURE — 97597 DBRDMT OPN WND 1ST 20 CM/<: CPT

## 2024-04-16 NOTE — PROGRESS NOTES
"  Subjective:       Patient ID: Betty Faustin is a 43 y.o. female.    Chief Complaint: Wound Care (Left upper arm (HEALED)/Lower ABD/Upper chest/Upper back)    HPI  3/26/24 - Ms. Faustin is a 43 year old  female who is being seen for multiple surgical incisions.  These incisions are the result of cosmetic surgery procedures which occurred in [place] on 3/6/24.  She was discharged to home following surgery for an oral abx.  She does not know the name of that abx but completed it three days ago.  She has multiple incisions that are, for the most part, approximated.  She does still have many staples in place as well as a LINUS drain on each side of her lower abdomen.  She was told to "pull" the drains when there is less that 25 cc of exudate per day.  She reports that she is still significantly above that amount today.   The exudate and drains were not assessed today by this provider.  Her procedures included arm lift (Brachioplasty), Matilda de lis Abdominoplasty and a  reverse tummy tuck.  She was instructed to begin application of Betadine on all incision lines.  We will plan to debride the fibrin at her next visit as well as remove stables that appear ready.  There is a small dehisced area are the left lower abdominal quadrant.  There is also erythema at the back incision.  This may be due to friction as she is only wearing a T-shirt.  She was instructed to begin using an ABD pad over the back incision to prevent irritation.  Out of an abundance of caution, an Rx for Doxycycline, 10 days, was sent to her pharmacy.    4/2/24 - The patient returns today for f/up on the multiple surgical incisions resulting from cosmetic surgery.  The majority of the areas are progressing well.  She does still have irritation and erythema across the upper back which is likely due to friction sitting in chairs, etc.  Additionally there is one concerning area of dehiscence at the left lower quadrant, as well as under the left " axilla.  She will begin application of mupirocin on these areas and continue the doxycycline (3 days remaining).  We will reevaluate next week for possible additional abx, if indicated.  Today selective debridement was done of a large portion of the incisions, removal fibrin.  Also, multiple staples and sutures were removed.  She does still have both LINUS drains on either side of her lower abdomen and does report that she still has approximately 100 cc of exudate per day.  They are to be pulled at 25 cc per day.     4/16/24 - Ms. Faustin returns for f/up on her cosmetic surgical incisions.  Several scattered areas were debrided, including the upper back which does have two areas of dehiscence.  The right inferior breast has open areas as well.  The most concerning area is the left lower quadrant of the abdomen.  This opening was debrided, with removal of a staple and suture, both of which were imbedded in the wound.  A culture was obtained and if there are no pathogens we will staple the wound closed at her next visit.  She will continue to apply mupirocin on all of the small open areas except for the left lower abdomen.  She will apply coconut oil with vitamin e on all closed areas.        Review of Systems      Objective:      Vitals:    04/16/24 1416   BP: 130/74   Pulse: 84   Resp: 17         Physical Exam       Altered Skin Integrity 03/26/24 1421  lower Abdomen #3 (Active)   03/26/24 1421   Altered Skin Integrity Present on Admission - Did Patient arrive to the hospital with altered skin?: yes   Side:    Orientation: lower   Location: Abdomen   Wound Number: #3   Is this injury device related?: No   Primary Wound Type:    Description of Altered Skin Integrity:    Ankle-Brachial Index:    Pulses:    Removal Indication and Assessment:    Wound Outcome:    (Retired) Wound Length (cm):    (Retired) Wound Width (cm):    (Retired) Depth (cm):    Wound Description (Comments):    Removal Indications:    Dressing  Appearance Moist drainage 04/16/24 1257   Drainage Amount Moderate 04/16/24 1257   Drainage Characteristics/Odor Serosanguineous 04/16/24 1257   Appearance Intact;Slough;Yellow;Moist;Epithelialization;Granulating;Staples intact;Sutures intact 04/16/24 1257   Tissue loss description Full thickness 04/16/24 1257   Periwound Area Intact;Dry 04/16/24 1257   Wound Edges Open 04/16/24 1257   Wound Length (cm) 1.2 cm 04/16/24 1257   Wound Width (cm) 5.5 cm 04/16/24 1257   Wound Depth (cm) 0.5 cm 04/16/24 1257   Wound Volume (cm^3) 3.3 cm^3 04/16/24 1257   Wound Surface Area (cm^2) 6.6 cm^2 04/16/24 1257   Undermining (depth (cm)/location) 2.7cm from 10-2 o'clock 04/16/24 1257   Care Cleansed with:;Wound cleanser 04/16/24 1257   Dressing Applied 04/16/24 1257   Dressing Change Due 04/17/24 04/16/24 1257            Altered Skin Integrity 03/26/24 1425 upper Chest #5 (Active)   03/26/24 1425   Altered Skin Integrity Present on Admission - Did Patient arrive to the hospital with altered skin?: yes   Side:    Orientation: upper   Location: Chest   Wound Number: #5   Is this injury device related?: Other (see comments)   Primary Wound Type:    Description of Altered Skin Integrity:    Ankle-Brachial Index:    Pulses:    Removal Indication and Assessment:    Wound Outcome:    (Retired) Wound Length (cm):    (Retired) Wound Width (cm):    (Retired) Depth (cm):    Wound Description (Comments):    Removal Indications:    Dressing Appearance Open to air 04/16/24 1257   Drainage Amount Moderate 04/16/24 1257   Appearance Intact;Pink;Slough;Moist;Epithelialization;Granulating 04/16/24 1257   Tissue loss description Full thickness 04/16/24 1257   Periwound Area Intact;Dry 04/16/24 1257   Wound Edges Defined 04/16/24 1257   Wound Length (cm) 0.3 cm 04/16/24 1257   Wound Width (cm) 4.7 cm 04/16/24 1257   Wound Depth (cm) 0.2 cm 04/16/24 1257   Wound Volume (cm^3) 0.282 cm^3 04/16/24 1257   Wound Surface Area (cm^2) 1.41 cm^2 04/16/24 1257    Care Cleansed with:;Wound cleanser 04/16/24 1257   Dressing Applied;Other (comment) 04/16/24 1257   Dressing Change Due 04/17/24 04/16/24 1257            Altered Skin Integrity 04/03/24 0946 upper Back #6 (Active)   04/03/24 0946   Altered Skin Integrity Present on Admission - Did Patient arrive to the hospital with altered skin?: yes   Side:    Orientation: upper   Location: Back   Wound Number: #6   Is this injury device related?:    Primary Wound Type:    Description of Altered Skin Integrity:    Ankle-Brachial Index:    Pulses:    Removal Indication and Assessment:    Wound Outcome:    (Retired) Wound Length (cm):    (Retired) Wound Width (cm):    (Retired) Depth (cm):    Wound Description (Comments):    Removal Indications:    Dressing Appearance Open to air 04/16/24 1257   Drainage Amount None 04/16/24 1257   Appearance Intact;Dry;Eschar 04/16/24 1257   Tissue loss description Full thickness 04/16/24 1257   Periwound Area Intact;Dry 04/16/24 1257   Wound Edges Approximated 04/16/24 1257   Wound Length (cm) 0.5 cm 04/16/24 1417   Wound Width (cm) 1.7 cm 04/16/24 1417   Wound Depth (cm) 0.2 cm 04/16/24 1417   Wound Volume (cm^3) 0.17 cm^3 04/16/24 1417   Wound Surface Area (cm^2) 0.85 cm^2 04/16/24 1417   Care Cleansed with:;Other (see comments);Debrided 04/16/24 1257   Dressing Applied;Other (comment) 04/16/24 1257   Dressing Change Due 04/17/24 04/16/24 1257       [REMOVED]      (Retired) Altered Skin Integrity 03/26/24 1401 Left upper Arm #1 Other (comment) (Removed)   03/26/24 1401   Altered Skin Integrity Present on Admission - Did Patient arrive to the hospital with altered skin?: yes   Side: Left   Orientation: upper   Location: Arm   Wound Number: #1   Is this injury device related?: Other (see comments)   Primary Wound Type: Other   Description of Altered Skin Integrity:    Ankle-Brachial Index:    Pulses:    Removal Indication and Assessment:    Wound Outcome: Healed   (Retired) Wound Length (cm):     (Retired) Wound Width (cm):    (Retired) Depth (cm):    Wound Description (Comments):    Removal Indications:    Removed 04/16/24 1425   Dressing Appearance Open to air;other (see comments) 04/16/24 1257   Drainage Amount None 04/16/24 1257   Appearance Intact;Eschar;Dry;Granulating;Epithelialization 04/16/24 1257   Periwound Area Dry 04/16/24 1257   Wound Edges Approximated;Rolled/closed 04/16/24 1257   Wound Length (cm) 0 cm 04/16/24 1257   Wound Width (cm) 0 cm 04/16/24 1257   Wound Depth (cm) 0 cm 04/16/24 1257   Wound Volume (cm^3) 0 cm^3 04/16/24 1257   Wound Surface Area (cm^2) 0 cm^2 04/16/24 1257   Care Cleansed with:;Wound cleanser;Debrided 04/16/24 1257         Assessment:         ICD-10-CM ICD-9-CM   1. Dehiscence of internal surgical incision, subsequent encounter  T81.32XD V58.89     998.31   2. Presence of surgical incision  Z78.9 V49.89   3. History of cosmetic surgery  Z98.890 V45.89           Procedures:       Left arm/Axilla                                              Right arm/axilla  Betadine                 Right lower abdomen/Hips  Betadine       Abdominal midline  Betadine               Right anterior chest                                    Left lateral upper chest                             Upper back                                                  Right lateral upper chest                              Left anterior upper chest                              TR    4/2/24          Left arm/axilla (pre)                                      Right arm/axilla (pre)                                   Chest (pre)               Upper back (pre)                                          Lower back (pre)                                         Midline abd (pre)                                         Lower horizontal abd (pre)               Lower horizontal abd (post)                         Midline abd (post)                                        Chest (post)                                         "        Back (post)  (Betadine to all areas)         Left arm/axilla (post)                                    Right arm/axilla (post)  (Betadine to all areas)          4/16/24.    Left upper arm  Healed        Lower abdomen  silver alginate, gentle border         Upper chest  SONA, silver alginate, gentle border           Upper back- Pre                                          Post  SONA, silver alginate, gentle border   TR      Debridement     Date/Time: 4/16/2024 12:52 PM     Performed by: Amalia Holloway NP  Authorized by: Amalia Holloway NP    Time out: Immediately prior to procedure a "time out" was called to verify the correct patient, procedure, equipment, support staff and site/side marked as required.     Consent Done?:  Yes (Verbal)     Preparation: Patient was prepped and draped with clean technique    Local anesthesia used?: No       Wound Details:    Location:  Abdomen (Left lower)    Type of Debridement:  Non-excisional       Length (cm):  1.2       Area (sq cm):  6.6       Width (cm):  5.5       Percent Debrided (%):  100       Depth (cm):  0.5       Total Area Debrided (sq cm):  6.6    Depth of debridement:  Epidermis/Dermis    Devitalized tissue debrided:  Biofilm, Exudate, Fibrin and Slough (1 staple, 1 suture)    Instruments:  Forceps, Scissors and Curette (Dermal)  Bleeding:  None     2nd Wound Details:     Location:  Back (Upper back)    Type of Debridement:  Non-excisional       Length (cm):  0.5       Area (sq cm):  0.85       Width (cm):  1.7       Percent Debrided (%):  100       Depth (cm):  0.2       Total Area Debrided (sq cm):  0.85    Depth of debridement:  Epidermis/Dermis    Devitalized tissue debrided:  Biofilm, Callus, Exudate and Fibrin    Instruments:  Curette (Dermal)  Bleeding:  None  Patient tolerance:  Patient tolerated the procedure well with no immediate complications  Specimen Collected: Specimen sent to microbiology       [] Yes [] No   I & D performed  [] Yes [] No " "  Excisional debridement performed  [x] Yes [] No   Selective debridement performed           [] Yes [] No   Mechanical debridement performed         [] Yes [] No  Silver nitrate applied                                     [] Yes [] No  Labs ordered this visit                                  [] Yes [] No   Imaging ordered this visit                           [x] Yes [] No   Tissue pathology and/or culture taken  24- Lower abdomen           MEDICATIONS    Current Outpatient Medications:     calcium carbonate 195 mg calcium (500 mg) Chew, Take by mouth., Disp: , Rfl:     ferrous sulfate 27 mg iron Tab, Take by mouth., Disp: , Rfl:     mecobalamin, vitamin B12, 1,000 mcg Chew, Take by mouth., Disp: , Rfl:     mupirocin (BACTROBAN) 2 % ointment, Apply topically once daily., Disp: 30 g, Rfl: 1    pantoprazole (PROTONIX) 40 MG tablet, Take 40 mg by mouth every evening. , Disp: , Rfl: 11    tranexamic acid (LYSTEDA) 650 mg tablet, SMARTSI-2 Tablet(s) By Mouth Every 12 Hours, Disp: , Rfl:    Review of patient's allergies indicates:   Allergen Reactions    Penicillins      At age 18 years , had tonsils removed and had Penicillin   Had a rash       DIAGNOSTICS      Labs/Scans/Micro:    CBC:   Lab Results   Component Value Date    WBC 8.2 2019    HGB 10.0 (L) 2019    HCT 31.6 (L) 2019    MCV 83.8 2019     (H) 2019       Sedimentation rate: No results found for: "SEDRATE" C-reactive protein: No results found for: "CRP" Chemistry: [unfilled]  HBA1C: No components found for: "HBA1C"    Ankle Brachial Index: No results found for this or any previous visit.      Imaging:    Plain film: No results found for this or any previous visit.    MRI: No results found for this or any previous visit.   No results found for this or any previous visit.    Bone Scan: No results found for this or any previous visit.   No results found for this or any previous visit.        Microbiology: Rx for " Doxy sent to pharmacy    HOME HEALTH AGENCY:  NONE  TIMES PER WEEK/DAYS:    WOUND CARE ORDERS: Cleanse all incisions with dial soap and water and let air dry for 15-20 min, apply Mupirocin ointment to upper back and upper chest incision only, cover upper back incision with ABD pad and upper chest with dry gauze for protection to be changed daily.  Cover lower abdomen with silver alginate and a gentle border to be changed daily.     Follow up in about 1 week (around 4/23/2024) for stretcher.

## 2024-04-16 NOTE — PROCEDURES
"Debridement    Date/Time: 4/16/2024 12:52 PM    Performed by: Amalia Holloway NP  Authorized by: Amalia Holloway NP    Time out: Immediately prior to procedure a "time out" was called to verify the correct patient, procedure, equipment, support staff and site/side marked as required.    Consent Done?:  Yes (Verbal)    Preparation: Patient was prepped and draped with clean technique    Local anesthesia used?: No      Wound Details:    Location:  Abdomen (Left lower)    Type of Debridement:  Non-excisional       Length (cm):  1.2       Area (sq cm):  6.6       Width (cm):  5.5       Percent Debrided (%):  100       Depth (cm):  0.5       Total Area Debrided (sq cm):  6.6    Depth of debridement:  Epidermis/Dermis    Devitalized tissue debrided:  Biofilm, Exudate, Fibrin and Slough (1 staple, 1 suture)    Instruments:  Forceps, Scissors and Curette (Dermal)  Bleeding:  None    2nd Wound Details:     Location:  Back (Upper back)    Type of Debridement:  Non-excisional       Length (cm):  0.5       Area (sq cm):  0.85       Width (cm):  1.7       Percent Debrided (%):  100       Depth (cm):  0.2       Total Area Debrided (sq cm):  0.85    Depth of debridement:  Epidermis/Dermis    Devitalized tissue debrided:  Biofilm, Callus, Exudate and Fibrin    Instruments:  Curette (Dermal)  Bleeding:  None  Patient tolerance:  Patient tolerated the procedure well with no immediate complications  Specimen Collected: Specimen sent to microbiology    "

## 2024-04-18 ENCOUNTER — DOCUMENTATION ONLY (OUTPATIENT)
Dept: WOUND CARE | Facility: HOSPITAL | Age: 44
End: 2024-04-18
Payer: MEDICAID

## 2024-04-18 RX ORDER — LEVOFLOXACIN 500 MG/1
750 TABLET, FILM COATED ORAL DAILY
Qty: 15 TABLET | Refills: 0 | Status: SHIPPED | OUTPATIENT
Start: 2024-04-18 | End: 2024-04-23

## 2024-04-18 NOTE — ADDENDUM NOTE
Encounter addended by: Amalia Holloway NP on: 4/18/2024 8:56 AM   Actions taken: External results created

## 2024-04-18 NOTE — PROGRESS NOTES
Spoke with patient regarding culture results.  Abx have been sent in to pharmacy.  Cephalexin was 1st recommendation/unable due to pen allergy.  Levofloxacin sent in, begin daily; also apply a thin coat of mupirocin to entire wound bed, cleaning thoroughly first, reapply daily.  Patient understood instructions.  Return to clinic in one week.

## 2024-04-23 ENCOUNTER — HOSPITAL ENCOUNTER (OUTPATIENT)
Dept: WOUND CARE | Facility: HOSPITAL | Age: 44
Discharge: HOME | End: 2024-04-23
Attending: NURSE PRACTITIONER
Payer: MEDICAID

## 2024-04-23 VITALS
SYSTOLIC BLOOD PRESSURE: 126 MMHG | BODY MASS INDEX: 28.55 KG/M2 | DIASTOLIC BLOOD PRESSURE: 77 MMHG | WEIGHT: 203.94 LBS | HEART RATE: 79 BPM | RESPIRATION RATE: 16 BRPM | HEIGHT: 71 IN

## 2024-04-23 DIAGNOSIS — Z98.890 HISTORY OF COSMETIC SURGERY: ICD-10-CM

## 2024-04-23 DIAGNOSIS — Z78.9 PRESENCE OF SURGICAL INCISION: ICD-10-CM

## 2024-04-23 DIAGNOSIS — T81.32XD DEHISCENCE OF INTERNAL SURGICAL INCISION, SUBSEQUENT ENCOUNTER: Primary | ICD-10-CM

## 2024-04-23 PROCEDURE — 27000999 HC MEDICAL RECORD PHOTO DOCUMENTATION

## 2024-04-23 PROCEDURE — 99212 OFFICE O/P EST SF 10 MIN: CPT

## 2024-04-23 RX ORDER — LEVOFLOXACIN 750 MG/1
TABLET ORAL
COMMUNITY
Start: 2024-04-18 | End: 2024-05-07

## 2024-04-23 NOTE — PROGRESS NOTES
"    Subjective:       Patient ID: Betty Faustin is a 43 y.o. female.    Chief Complaint: Wound Care (Lower ABD/Upper chest/Upper back)    HPI  3/26/24 - Ms. Faustin is a 43 year old  female who is being seen for multiple surgical incisions.  These incisions are the result of cosmetic surgery procedures which occurred in [place] on 3/6/24.  She was discharged to home following surgery for an oral abx.  She does not know the name of that abx but completed it three days ago.  She has multiple incisions that are, for the most part, approximated.  She does still have many staples in place as well as a LINUS drain on each side of her lower abdomen.  She was told to "pull" the drains when there is less that 25 cc of exudate per day.  She reports that she is still significantly above that amount today.   The exudate and drains were not assessed today by this provider.  Her procedures included arm lift (Brachioplasty), Matilda de lis Abdominoplasty and a  reverse tummy tuck.  She was instructed to begin application of Betadine on all incision lines.  We will plan to debride the fibrin at her next visit as well as remove stables that appear ready.  There is a small dehisced area are the left lower abdominal quadrant.  There is also erythema at the back incision.  This may be due to friction as she is only wearing a T-shirt.  She was instructed to begin using an ABD pad over the back incision to prevent irritation.  Out of an abundance of caution, an Rx for Doxycycline, 10 days, was sent to her pharmacy.    4/2/24 - The patient returns today for f/up on the multiple surgical incisions resulting from cosmetic surgery.  The majority of the areas are progressing well.  She does still have irritation and erythema across the upper back which is likely due to friction sitting in chairs, etc.  Additionally there is one concerning area of dehiscence at the left lower quadrant, as well as under the left axilla.  She will begin " application of mupirocin on these areas and continue the doxycycline (3 days remaining).  We will reevaluate next week for possible additional abx, if indicated.  Today selective debridement was done of a large portion of the incisions, removal fibrin.  Also, multiple staples and sutures were removed.  She does still have both LINUS drains on either side of her lower abdomen and does report that she still has approximately 100 cc of exudate per day.  They are to be pulled at 25 cc per day.     4/16/24 - Ms. Faustin returns for f/up on her cosmetic surgical incisions.  Several scattered areas were debrided, including the upper back which does have two areas of dehiscence.  The right inferior breast has open areas as well.  The most concerning area is the left lower quadrant of the abdomen.  This opening was debrided, with removal of a staple and suture, both of which were imbedded in the wound.  A culture was obtained and if there are no pathogens we will staple the wound closed at her next visit.  She will continue to apply mupirocin on all of the small open areas except for the left lower abdomen.  She will apply coconut oil with vitamin e on all closed areas.      4/23/24 - All areas continue to improve including the most concerning area at the left lower abdomen.  This area is improving since removal of a staple at her last visit.  Today we will begin application of mesalt to the wound bed.  She began levofloxacin on 4/18/24 which is also improving the incision overall.  Today approximately 8 additional sutures were palpated and removed from the incision line.  She does still have the LINUS drains, and reports approximately 35 cc of drainage in each, daily.  We will remove at 25 cc per day.  She will continue mupirocin at all other open areas.        Review of Systems      Objective:      Vitals:    04/23/24 1359   BP: 126/77   Pulse: 79   Resp: 16           Physical Exam       Altered Skin Integrity 03/26/24 1421   lower Abdomen #3 (Active)   03/26/24 1421   Altered Skin Integrity Present on Admission - Did Patient arrive to the hospital with altered skin?: yes   Side:    Orientation: lower   Location: Abdomen   Wound Number: #3   Is this injury device related?: No   Primary Wound Type:    Description of Altered Skin Integrity:    Ankle-Brachial Index:    Pulses:    Removal Indication and Assessment:    Wound Outcome:    (Retired) Wound Length (cm):    (Retired) Wound Width (cm):    (Retired) Depth (cm):    Wound Description (Comments):    Removal Indications:    Dressing Appearance Intact;Moist drainage 04/23/24 1302   Drainage Amount Moderate 04/23/24 1302   Drainage Characteristics/Odor Serosanguineous 04/23/24 1302   Appearance Intact;Slough;Moist;Granulating;Epithelialization 04/23/24 1302   Tissue loss description Full thickness 04/23/24 1302   Periwound Area Intact;Dry 04/23/24 1302   Wound Edges Open 04/23/24 1302   Wound Length (cm) 1 cm 04/23/24 1302   Wound Width (cm) 4.5 cm 04/23/24 1302   Wound Depth (cm) 0.5 cm 04/23/24 1302   Wound Volume (cm^3) 2.25 cm^3 04/23/24 1302   Wound Surface Area (cm^2) 4.5 cm^2 04/23/24 1302   Undermining (depth (cm)/location) 2.2cm 11-1 o'clock 04/23/24 1302   Care Cleansed with:;Wound cleanser 04/23/24 1302   Dressing Applied 04/23/24 1302   Dressing Change Due 04/24/24 04/23/24 1302            Altered Skin Integrity 03/26/24 1425 upper Chest #5 (Active)   03/26/24 1425   Altered Skin Integrity Present on Admission - Did Patient arrive to the hospital with altered skin?: yes   Side:    Orientation: upper   Location: Chest   Wound Number: #5   Is this injury device related?: Other (see comments)   Primary Wound Type:    Description of Altered Skin Integrity:    Ankle-Brachial Index:    Pulses:    Removal Indication and Assessment:    Wound Outcome:    (Retired) Wound Length (cm):    (Retired) Wound Width (cm):    (Retired) Depth (cm):    Wound Description (Comments):    Removal  Indications:    Description of Altered Skin Integrity Full thickness tissue loss. Subcutaneous fat may be visible but bone, tendon or muscle are not exposed 04/23/24 1302   Dressing Appearance Open to air 04/23/24 1302   Drainage Amount Moderate 04/23/24 1302   Appearance Intact;Pink;Moist;Bleeding;Granulating;Epithelialization 04/23/24 1302   Tissue loss description Full thickness 04/23/24 1302   Periwound Area Intact;Dry 04/23/24 1302   Wound Edges Defined 04/23/24 1302   Wound Length (cm) 0.4 cm 04/23/24 1302   Wound Width (cm) 6 cm 04/23/24 1302   Wound Depth (cm) 0.2 cm 04/23/24 1302   Wound Volume (cm^3) 0.48 cm^3 04/23/24 1302   Wound Surface Area (cm^2) 2.4 cm^2 04/23/24 1302   Care Cleansed with:;Wound cleanser 04/23/24 1302   Dressing Applied 04/23/24 1302   Dressing Change Due 04/24/24 04/23/24 1302            Altered Skin Integrity 04/03/24 0946 upper Back #6 (Active)   04/03/24 0946   Altered Skin Integrity Present on Admission - Did Patient arrive to the hospital with altered skin?: yes   Side:    Orientation: upper   Location: Back   Wound Number: #6   Is this injury device related?:    Primary Wound Type:    Description of Altered Skin Integrity:    Ankle-Brachial Index:    Pulses:    Removal Indication and Assessment:    Wound Outcome:    (Retired) Wound Length (cm):    (Retired) Wound Width (cm):    (Retired) Depth (cm):    Wound Description (Comments):    Removal Indications:    Dressing Appearance Open to air 04/23/24 1302   Drainage Amount None 04/23/24 1302   Appearance Intact;Eschar;Fibrin;Dry 04/23/24 1302   Tissue loss description Full thickness 04/23/24 1302   Periwound Area Intact;Dry 04/23/24 1302   Wound Edges Approximated 04/23/24 1302   Wound Length (cm) 0.5 cm 04/23/24 1302   Wound Width (cm) 2.5 cm 04/23/24 1302   Wound Depth (cm) 0.2 cm 04/23/24 1302   Wound Volume (cm^3) 0.25 cm^3 04/23/24 1302   Wound Surface Area (cm^2) 1.25 cm^2 04/23/24 1302   Care Cleansed with: 04/23/24 1302    Dressing Applied 04/23/24 1302   Dressing Change Due 04/24/24 04/23/24 1302         Assessment:         ICD-10-CM ICD-9-CM   1. Dehiscence of internal surgical incision, subsequent encounter  T81.32XD V58.89     998.31   2. Presence of surgical incision  Z78.9 V49.89   3. History of cosmetic surgery  Z98.890 V45.89             Procedures:       Left arm/Axilla                                              Right arm/axilla  Betadine                 Right lower abdomen/Hips  Betadine       Abdominal midline  Betadine               Right anterior chest                                    Left lateral upper chest                             Upper back                                                  Right lateral upper chest                              Left anterior upper chest                              TR    4/2/24          Left arm/axilla (pre)                                      Right arm/axilla (pre)                                   Chest (pre)               Upper back (pre)                                          Lower back (pre)                                         Midline abd (pre)                                         Lower horizontal abd (pre)               Lower horizontal abd (post)                         Midline abd (post)                                        Chest (post)                                                Back (post)  (Betadine to all areas)         Left arm/axilla (post)                                    Right arm/axilla (post)  (Betadine to all areas)          4/16/24.    Left upper arm  Healed        Lower abdomen  silver alginate, gentle border         Upper chest  SONA, silver alginate, gentle border           Upper back- Pre                                          Post  SONA, silver alginate, gentle border   TR      4/23/24              Upper chest                                     Lower abdomen                                                upper back                       "                     SONA                                          mesalt, silver alginate, gentle border                       SONA    Mechanical debridement only    [] Yes [] No   I & D performed  [] Yes [] No   Excisional debridement performed  [] Yes [] No   Selective debridement performed           [x] Yes [] No   Mechanical debridement performed         [] Yes [] No  Silver nitrate applied                                     [] Yes [] No  Labs ordered this visit                                  [] Yes [] No   Imaging ordered this visit                           [] Yes [] No   Tissue pathology and/or culture taken  24- Lower abdomen           MEDICATIONS    Current Outpatient Medications:     levoFLOXacin (LEVAQUIN) 750 MG tablet, TAKE ONE TABLET BY MOUTH ONCE DAILY FOR 10 DAYS FOR INFECTION (TAKE WITH FOOD AND PLENTY WATER), Disp: , Rfl:     calcium carbonate 195 mg calcium (500 mg) Chew, Take by mouth., Disp: , Rfl:     ferrous sulfate 27 mg iron Tab, Take by mouth., Disp: , Rfl:     mecobalamin, vitamin B12, 1,000 mcg Chew, Take by mouth., Disp: , Rfl:     mupirocin (BACTROBAN) 2 % ointment, Apply topically once daily., Disp: 30 g, Rfl: 1    pantoprazole (PROTONIX) 40 MG tablet, Take 40 mg by mouth every evening. , Disp: , Rfl: 11    tranexamic acid (LYSTEDA) 650 mg tablet, SMARTSI-2 Tablet(s) By Mouth Every 12 Hours, Disp: , Rfl:    Review of patient's allergies indicates:   Allergen Reactions    Penicillins      At age 18 years , had tonsils removed and had Penicillin   Had a rash       DIAGNOSTICS      Labs/Scans/Micro:    CBC:   Lab Results   Component Value Date    WBC 8.2 2019    HGB 10.0 (L) 2019    HCT 31.6 (L) 2019    MCV 83.8 2019     (H) 2019       Sedimentation rate: No results found for: "SEDRATE" C-reactive protein: No results found for: "CRP" Chemistry: [unfilled]  HBA1C: No components found for: "HBA1C"    Ankle Brachial Index: No results found " for this or any previous visit.      Imaging:    Plain film: No results found for this or any previous visit.    MRI: No results found for this or any previous visit.   No results found for this or any previous visit.    Bone Scan: No results found for this or any previous visit.   No results found for this or any previous visit.        Microbiology: Rx for Doxy sent to pharmacy    [place] [facility]:  NONE  TIMES PER WEEK/DAYS:    WOUND CARE ORDERS:  Lower abdominal wound: Cleanse with wound cleanser, tuck mesalt into undermining covering entire wound bed, dress with silver alginate, 4x4 and secure with tape to be changed daily.   Upper chest/upper back wounds: Cleanse with wound cleanser, apply Mupirocin ointment to open incision sites, cover  with an ABD pad and upper chest with dry gauze for protection to be changed daily.      Follow up in about 1 week (around 4/30/2024) for Camila

## 2024-04-24 NOTE — ADDENDUM NOTE
Encounter addended by: Amalia Holloway NP on: 4/24/2024 9:24 AM   Actions taken: Clinical Note Signed

## 2024-05-07 ENCOUNTER — HOSPITAL ENCOUNTER (OUTPATIENT)
Dept: WOUND CARE | Facility: HOSPITAL | Age: 44
Discharge: HOME | End: 2024-05-07
Attending: NURSE PRACTITIONER
Payer: MEDICAID

## 2024-05-07 VITALS
SYSTOLIC BLOOD PRESSURE: 125 MMHG | HEIGHT: 71 IN | HEART RATE: 67 BPM | DIASTOLIC BLOOD PRESSURE: 60 MMHG | RESPIRATION RATE: 18 BRPM | WEIGHT: 203.94 LBS | BODY MASS INDEX: 28.55 KG/M2

## 2024-05-07 DIAGNOSIS — Z98.890 HISTORY OF COSMETIC SURGERY: ICD-10-CM

## 2024-05-07 DIAGNOSIS — T81.32XD DEHISCENCE OF INTERNAL SURGICAL INCISION, SUBSEQUENT ENCOUNTER: Primary | ICD-10-CM

## 2024-05-07 DIAGNOSIS — Z78.9 PRESENCE OF SURGICAL INCISION: ICD-10-CM

## 2024-05-07 PROCEDURE — 99213 OFFICE O/P EST LOW 20 MIN: CPT

## 2024-05-07 PROCEDURE — 27000999 HC MEDICAL RECORD PHOTO DOCUMENTATION

## 2024-05-07 NOTE — PROGRESS NOTES
"    Subjective:       Patient ID: Betty Faustin is a 43 y.o. female.    Chief Complaint: Wound Care ((Lower ABD/Upper chest/Upper back))    HPI  3/26/24 - Ms. Faustin is a 43 year old  female who is being seen for multiple surgical incisions.  These incisions are the result of cosmetic surgery procedures which occurred in [place] on 3/6/24.  She was discharged to home following surgery for an oral abx.  She does not know the name of that abx but completed it three days ago.  She has multiple incisions that are, for the most part, approximated.  She does still have many staples in place as well as a LINUS drain on each side of her lower abdomen.  She was told to "pull" the drains when there is less that 25 cc of exudate per day.  She reports that she is still significantly above that amount today.   The exudate and drains were not assessed today by this provider.  Her procedures included arm lift (Brachioplasty), Matilda de lis Abdominoplasty and a  reverse tummy tuck.  She was instructed to begin application of Betadine on all incision lines.  We will plan to debride the fibrin at her next visit as well as remove stables that appear ready.  There is a small dehisced area are the left lower abdominal quadrant.  There is also erythema at the back incision.  This may be due to friction as she is only wearing a T-shirt.  She was instructed to begin using an ABD pad over the back incision to prevent irritation.  Out of an abundance of caution, an Rx for Doxycycline, 10 days, was sent to her pharmacy.    4/2/24 - The patient returns today for f/up on the multiple surgical incisions resulting from cosmetic surgery.  The majority of the areas are progressing well.  She does still have irritation and erythema across the upper back which is likely due to friction sitting in chairs, etc.  Additionally there is one concerning area of dehiscence at the left lower quadrant, as well as under the left axilla.  She will begin " application of mupirocin on these areas and continue the doxycycline (3 days remaining).  We will reevaluate next week for possible additional abx, if indicated.  Today selective debridement was done of a large portion of the incisions, removal fibrin.  Also, multiple staples and sutures were removed.  She does still have both LINUS drains on either side of her lower abdomen and does report that she still has approximately 100 cc of exudate per day.  They are to be pulled at 25 cc per day.     4/16/24 - Ms. Faustin returns for f/up on her cosmetic surgical incisions.  Several scattered areas were debrided, including the upper back which does have two areas of dehiscence.  The right inferior breast has open areas as well.  The most concerning area is the left lower quadrant of the abdomen.  This opening was debrided, with removal of a staple and suture, both of which were imbedded in the wound.  A culture was obtained and if there are no pathogens we will staple the wound closed at her next visit.  She will continue to apply mupirocin on all of the small open areas except for the left lower abdomen.  She will apply coconut oil with vitamin e on all closed areas.      4/23/24 - All areas continue to improve including the most concerning area at the left lower abdomen.  This area is improving since removal of a staple at her last visit.  Today we will begin application of mesalt to the wound bed.  She began levofloxacin on 4/18/24 which is also improving the incision overall.  Today approximately 8 additional sutures were palpated and removed from the incision line.  She does still have the LINUS drains, and reports approximately 35 cc of drainage in each, daily.  We will remove at 25 cc per day.  She will continue mupirocin at all other open areas.      5/7/24 - the patient returns today for followup on the surgical incisions that she has from cosmetic surgery.  Several of the areas were assessed and 3 additional sutures  were removed from the incision line.  The area to the left lower abdomen continues to improve and has decreased in size considerably.  There are very small scattered open areas along the incision line that she will continue to monitor, but there are no signs and symptoms of infection or concern at this time.  She has completed the p.o. Levaquin.  She also reports that she was able to successfully remove the 2 LINUS drains on 04/28/2024 as her surgeon had instructed her to do.        Review of Systems      Objective:      Vitals:    05/07/24 1310   BP: 125/60   Pulse: 67   Resp: 18           Physical Exam       Altered Skin Integrity 03/26/24 1421  lower Abdomen #3 (Active)   03/26/24 1421   Altered Skin Integrity Present on Admission - Did Patient arrive to the hospital with altered skin?: yes   Side:    Orientation: lower   Location: Abdomen   Wound Number: #3   Is this injury device related?: No   Primary Wound Type:    Description of Altered Skin Integrity:    Ankle-Brachial Index:    Pulses:    Removal Indication and Assessment:    Wound Outcome:    (Retired) Wound Length (cm):    (Retired) Wound Width (cm):    (Retired) Depth (cm):    Wound Description (Comments):    Removal Indications:    Dressing Appearance Moist drainage 05/07/24 1311   Drainage Amount Moderate 05/07/24 1311   Drainage Characteristics/Odor Serosanguineous 05/07/24 1311   Appearance Red;Moist;Slough 05/07/24 1311   Tissue loss description Full thickness 05/07/24 1311   Periwound Area Dry 05/07/24 1311   Wound Edges Open 05/07/24 1311   Wound Length (cm) 0.6 cm 05/07/24 1311   Wound Width (cm) 2 cm 05/07/24 1311   Wound Depth (cm) 0.5 cm 05/07/24 1311   Wound Volume (cm^3) 0.6 cm^3 05/07/24 1311   Wound Surface Area (cm^2) 1.2 cm^2 05/07/24 1311   Undermining (depth (cm)/location) 1.1 cm from 11-1 oclock 05/07/24 1311   Care Cleansed with:;Wound cleanser 05/07/24 1311   Dressing Applied 05/07/24 1311   Dressing Change Due 05/08/24 05/07/24 1311        [REMOVED]      Altered Skin Integrity 03/26/24 1425 upper Chest #5 (Removed)   03/26/24 1425   Altered Skin Integrity Present on Admission - Did Patient arrive to the hospital with altered skin?: yes   Side:    Orientation: upper   Location: Chest   Wound Number: #5   Is this injury device related?: Other (see comments)   Primary Wound Type:    Description of Altered Skin Integrity:    Ankle-Brachial Index:    Pulses:    Removal Indication and Assessment:    Wound Outcome: Healed   (Retired) Wound Length (cm):    (Retired) Wound Width (cm):    (Retired) Depth (cm):    Wound Description (Comments):    Removal Indications:    Removed 05/07/24 1342   Dressing Appearance Open to air 05/07/24 1311   Drainage Amount None 05/07/24 1311   Appearance Intact;Pink;Dry 05/07/24 1311   Periwound Area Intact;Dry;Pink 05/07/24 1311   Wound Length (cm) 0 cm 05/07/24 1311   Wound Width (cm) 0 cm 05/07/24 1311   Wound Depth (cm) 0 cm 05/07/24 1311   Wound Volume (cm^3) 0 cm^3 05/07/24 1311   Wound Surface Area (cm^2) 0 cm^2 05/07/24 1311       [REMOVED]      Altered Skin Integrity 04/03/24 0946 upper Back #6 (Removed)   04/03/24 0946   Altered Skin Integrity Present on Admission - Did Patient arrive to the hospital with altered skin?: yes   Side:    Orientation: upper   Location: Back   Wound Number: #6   Is this injury device related?:    Primary Wound Type:    Description of Altered Skin Integrity:    Ankle-Brachial Index:    Pulses:    Removal Indication and Assessment:    Wound Outcome: Healed   (Retired) Wound Length (cm):    (Retired) Wound Width (cm):    (Retired) Depth (cm):    Wound Description (Comments):    Removal Indications:    Removed 05/07/24 1342   Dressing Appearance Open to air 05/07/24 1311   Drainage Amount None 05/07/24 1311   Appearance Intact;Pink;Dry 05/07/24 1311   Periwound Area Dry;Intact;Pink 05/07/24 1311   Wound Length (cm) 0 cm 05/07/24 1311   Wound Width (cm) 0 cm 05/07/24 1311   Wound Depth  (cm) 0 cm 24 1311   Wound Volume (cm^3) 0 cm^3 24 1311   Wound Surface Area (cm^2) 0 cm^2 24 1311     24          Left lower abdomen (pre)                                Chest (under right breast, pre)                         Upper back (pre)  (Silver alginate, island dressing)          Assessment:         ICD-10-CM ICD-9-CM   1. Dehiscence of internal surgical incision, subsequent encounter  T81.32XD V58.89     998.31   2. Presence of surgical incision  Z78.9 V49.89   3. History of cosmetic surgery  Z98.890 V45.89               Procedures:       Suture removal x3  Mechanical debridement     [] Yes [] No   I & D performed  [] Yes [] No   Excisional debridement performed  [] Yes [] No   Selective debridement performed           [x] Yes [] No   Mechanical debridement performed         [] Yes [] No  Silver nitrate applied                                     [] Yes [] No  Labs ordered this visit                                  [] Yes [] No   Imaging ordered this visit                           [] Yes [] No   Tissue pathology and/or culture taken            MEDICATIONS    Current Outpatient Medications:     calcium carbonate 195 mg calcium (500 mg) Chew, Take by mouth., Disp: , Rfl:     ferrous sulfate 27 mg iron Tab, Take by mouth., Disp: , Rfl:     mecobalamin, vitamin B12, 1,000 mcg Chew, Take by mouth., Disp: , Rfl:     mupirocin (BACTROBAN) 2 % ointment, Apply topically once daily., Disp: 30 g, Rfl: 1    pantoprazole (PROTONIX) 40 MG tablet, Take 40 mg by mouth every evening. , Disp: , Rfl: 11    tranexamic acid (LYSTEDA) 650 mg tablet, SMARTSI-2 Tablet(s) By Mouth Every 12 Hours, Disp: , Rfl:    Review of patient's allergies indicates:   Allergen Reactions    Penicillins      At age 18 years , had tonsils removed and had Penicillin   Had a rash       DIAGNOSTICS      Labs/Scans/Micro:    CBC:   Lab Results   Component Value Date    WBC 8.2 2019    HGB 10.0 (L) 2019    HCT 31.6  "(L) 06/07/2019    MCV 83.8 06/07/2019     (H) 06/07/2019       Sedimentation rate: No results found for: "SEDRATE" C-reactive protein: No results found for: "CRP" Chemistry: [unfilled]  HBA1C: No components found for: "HBA1C"    Ankle Brachial Index: No results found for this or any previous visit.      Imaging:    Plain film: No results found for this or any previous visit.    MRI: No results found for this or any previous visit.   No results found for this or any previous visit.    Bone Scan: No results found for this or any previous visit.   No results found for this or any previous visit.        Microbiology: Rx for Doxy sent to pharmacy    HOME [facility] AGENC[facility]:  NONE  TIMES PER WEEK/DAYS:    WOUND CARE ORDERS:  Lower abdominal wound: Cleanse with wound cleanser, cover with silver alginate and island dressing- to be changed daily.   Keep coconut oil with vitamin E on all other incision lines daily      Follow up in 2 weeks (on 5/21/2024).       "

## 2024-05-09 DIAGNOSIS — C64.2 RENAL CELL CARCINOMA OF LEFT KIDNEY: Primary | ICD-10-CM

## 2024-05-21 ENCOUNTER — HOSPITAL ENCOUNTER (OUTPATIENT)
Dept: WOUND CARE | Facility: HOSPITAL | Age: 44
Discharge: HOME | End: 2024-05-21
Attending: NURSE PRACTITIONER
Payer: MEDICAID

## 2024-05-21 VITALS
DIASTOLIC BLOOD PRESSURE: 70 MMHG | RESPIRATION RATE: 18 BRPM | SYSTOLIC BLOOD PRESSURE: 108 MMHG | WEIGHT: 203.94 LBS | BODY MASS INDEX: 28.55 KG/M2 | HEART RATE: 57 BPM | HEIGHT: 71 IN

## 2024-05-21 DIAGNOSIS — Z98.890 HISTORY OF COSMETIC SURGERY: ICD-10-CM

## 2024-05-21 DIAGNOSIS — Z78.9 PRESENCE OF SURGICAL INCISION: ICD-10-CM

## 2024-05-21 DIAGNOSIS — T81.32XD DEHISCENCE OF INTERNAL SURGICAL INCISION, SUBSEQUENT ENCOUNTER: Primary | ICD-10-CM

## 2024-05-21 PROCEDURE — 99212 OFFICE O/P EST SF 10 MIN: CPT | Mod: 25

## 2024-05-21 PROCEDURE — 97597 DBRDMT OPN WND 1ST 20 CM/<: CPT

## 2024-05-21 PROCEDURE — 27000999 HC MEDICAL RECORD PHOTO DOCUMENTATION

## 2024-05-21 NOTE — PROGRESS NOTES
"  Subjective:       Patient ID: Betty Faustin is a 43 y.o. female.    Chief Complaint: Wound Care (Left lower abdomen /Midline lower abdomen )    HPI  3/26/24 - Ms. Faustin is a 43 year old  female who is being seen for multiple surgical incisions.  These incisions are the result of cosmetic surgery procedures which occurred in [place] on 3/6/24.  She was discharged to home following surgery for an oral abx.  She does not know the name of that abx but completed it three days ago.  She has multiple incisions that are, for the most part, approximated.  She does still have many staples in place as well as a LINUS drain on each side of her lower abdomen.  She was told to "pull" the drains when there is less that 25 cc of exudate per day.  She reports that she is still significantly above that amount today.   The exudate and drains were not assessed today by this provider.  Her procedures included arm lift (Brachioplasty), Matilda de lis Abdominoplasty and a  reverse tummy tuck.  She was instructed to begin application of Betadine on all incision lines.  We will plan to debride the fibrin at her next visit as well as remove stables that appear ready.  There is a small dehisced area are the left lower abdominal quadrant.  There is also erythema at the back incision.  This may be due to friction as she is only wearing a T-shirt.  She was instructed to begin using an ABD pad over the back incision to prevent irritation.  Out of an abundance of caution, an Rx for Doxycycline, 10 days, was sent to her pharmacy.    4/2/24 - The patient returns today for f/up on the multiple surgical incisions resulting from cosmetic surgery.  The majority of the areas are progressing well.  She does still have irritation and erythema across the upper back which is likely due to friction sitting in chairs, etc.  Additionally there is one concerning area of dehiscence at the left lower quadrant, as well as under the left axilla.  She will " begin application of mupirocin on these areas and continue the doxycycline (3 days remaining).  We will reevaluate next week for possible additional abx, if indicated.  Today selective debridement was done of a large portion of the incisions, removal fibrin.  Also, multiple staples and sutures were removed.  She does still have both LINUS drains on either side of her lower abdomen and does report that she still has approximately 100 cc of exudate per day.  They are to be pulled at 25 cc per day.     4/16/24 - Ms. Faustin returns for f/up on her cosmetic surgical incisions.  Several scattered areas were debrided, including the upper back which does have two areas of dehiscence.  The right inferior breast has open areas as well.  The most concerning area is the left lower quadrant of the abdomen.  This opening was debrided, with removal of a staple and suture, both of which were imbedded in the wound.  A culture was obtained and if there are no pathogens we will staple the wound closed at her next visit.  She will continue to apply mupirocin on all of the small open areas except for the left lower abdomen.  She will apply coconut oil with vitamin e on all closed areas.      4/23/24 - All areas continue to improve including the most concerning area at the left lower abdomen.  This area is improving since removal of a staple at her last visit.  Today we will begin application of mesalt to the wound bed.  She began levofloxacin on 4/18/24 which is also improving the incision overall.  Today approximately 8 additional sutures were palpated and removed from the incision line.  She does still have the LINUS drains, and reports approximately 35 cc of drainage in each, daily.  We will remove at 25 cc per day.  She will continue mupirocin at all other open areas.      5/7/24 - the patient returns today for followup on the surgical incisions that she has from cosmetic surgery.  Several of the areas were assessed and 3 additional  sutures were removed from the incision line.  The area to the left lower abdomen continues to improve and has decreased in size considerably.  There are very small scattered open areas along the incision line that she will continue to monitor, but there are no signs and symptoms of infection or concern at this time.  She has completed the p.o. Levaquin.  She also reports that she was able to successfully remove the 2 LINUS drains on 04/28/2024 as her surgeon had instructed her to do.    5/21/24 - The wound at the left lower abdomen continues to improve.  Today that wound was selectively debrided and there remains a small area that is open.  Additionally, three retained sutures were removed from the incision line that had been closed over.  She will return to clinic in two weeks.    Review of Systems      Objective:        Vitals:    05/21/24 1314   BP: 108/70   Pulse: (!) 57   Resp: 18     Physical Exam       Altered Skin Integrity 03/26/24 1421 Left lower Abdomen #3 (Active)   03/26/24 1421   Altered Skin Integrity Present on Admission - Did Patient arrive to the hospital with altered skin?: yes   Side: Left   Orientation: lower   Location: Abdomen   Wound Number: #3   Is this injury device related?: No   Primary Wound Type:    Description of Altered Skin Integrity:    Ankle-Brachial Index:    Pulses:    Removal Indication and Assessment:    Wound Outcome:    (Retired) Wound Length (cm):    (Retired) Wound Width (cm):    (Retired) Depth (cm):    Wound Description (Comments):    Removal Indications:    Dressing Appearance Dried drainage 05/21/24 1350   Drainage Amount Moderate 05/21/24 1350   Drainage Characteristics/Odor Serosanguineous 05/21/24 1350   Appearance Eschar;Dry 05/21/24 1350   Tissue loss description Full thickness 05/21/24 1350   Periwound Area Intact;Dry 05/21/24 1350   Wound Edges Open 05/21/24 1350   Wound Length (cm) 0.4 cm 05/21/24 1350   Wound Width (cm) 2 cm 05/21/24 1350   Wound Depth (cm) 0.2 cm  "05/21/24 1350   Wound Volume (cm^3) 0.16 cm^3 05/21/24 1350   Wound Surface Area (cm^2) 0.8 cm^2 05/21/24 1350   Care Cleansed with:;Wound cleanser 05/21/24 1350   Dressing Applied 05/21/24 1350   Dressing Change Due 05/22/24 05/21/24 1350            Wound 05/21/24 1351 Other (comment) midline Abdomen #2 (Active)   05/21/24 1351   Present on Original Admission:    Primary Wound Type: Other   Side:    Orientation: midline   Location: Abdomen   Wound Approximate Age at First Assessment (Weeks):    Wound Number: #2   Is this injury device related?: No   Incision Type:    Closure Method:    Wound Description (Comments):    Type:    Additional Comments:    Ankle-Brachial Index:    Pulses:    Removal Indication and Assessment:    Wound Outcome:    Drainage Amount Moderate 05/21/24 1350   Drainage Characteristics/Odor Purulent 05/21/24 1350   Appearance Moist;Pink 05/21/24 1350   Tissue loss description Full thickness 05/21/24 1350   Periwound Area Intact;Stepney 05/21/24 1350   Wound Edges Open 05/21/24 1350   Wound Length (cm) 0.2 cm 05/21/24 1350   Wound Width (cm) 2 cm 05/21/24 1350   Wound Depth (cm) 0.3 cm 05/21/24 1350   Wound Volume (cm^3) 0.12 cm^3 05/21/24 1350   Wound Surface Area (cm^2) 0.4 cm^2 05/21/24 1350   Care Cleansed with:;Wound cleanser 05/21/24 1350   Dressing Applied 05/21/24 1350   Dressing Change Due 05/22/24 05/21/24 1350     05/21/24      Left lower abdomen - pre                             Left lower abdomen   (Silver alginate, island dressing)   ML      Assessment:           ICD-10-CM ICD-9-CM   1. Dehiscence of internal surgical incision, subsequent encounter  T81.32XD V58.89     998.31   2. Presence of surgical incision  Z78.9 V49.89   3. History of cosmetic surgery  Z98.890 V45.89           Procedures:     Debridement     Date/Time: 5/21/2024 1:05 PM     Performed by: Amalia Holloway NP  Authorized by: Amalia Holloway NP    Time out: Immediately prior to procedure a "time out" was called " to verify the correct patient, procedure, equipment, support staff and site/side marked as required.     Consent Done?:  Yes (Verbal)     Preparation: Patient was prepped and draped with clean technique    Local anesthesia used?: No       Wound Details:    Location:  Abdomen (Left lower)    Type of Debridement:  Non-excisional       Length (cm):  0.4       Area (sq cm):  0.8       Width (cm):  2       Percent Debrided (%):  100       Depth (cm):  0.2       Total Area Debrided (sq cm):  0.8    Depth of debridement:  Epidermis/Dermis    Devitalized tissue debrided:  Biofilm, Callus, Exudate, Fibrin and Slough    Instruments:  Forceps and Scissors  Bleeding:  None  Patient tolerance:  Patient tolerated the procedure well with no immediate complications       [] Yes [] No   I & D performed  [] Yes [] No   Excisional debridement performed  [x] Yes [] No   Selective debridement performed           [] Yes [] No   Mechanical debridement performed         [] Yes [] No  Silver nitrate applied                                     [] Yes [] No  Labs ordered this visit                                  [] Yes [] No   Imaging ordered this visit                           [] Yes [] No   Tissue pathology and/or culture taken          MEDICATIONS    Current Outpatient Medications:     calcium carbonate 195 mg calcium (500 mg) Chew, Take by mouth., Disp: , Rfl:     ferrous sulfate 27 mg iron Tab, Take by mouth., Disp: , Rfl:     mecobalamin, vitamin B12, 1,000 mcg Chew, Take by mouth., Disp: , Rfl:     mupirocin (BACTROBAN) 2 % ointment, Apply topically once daily., Disp: 30 g, Rfl: 1    pantoprazole (PROTONIX) 40 MG tablet, Take 40 mg by mouth every evening. , Disp: , Rfl: 11    tranexamic acid (LYSTEDA) 650 mg tablet, SMARTSI-2 Tablet(s) By Mouth Every 12 Hours, Disp: , Rfl:    Review of patient's allergies indicates:   Allergen Reactions    Penicillins      At age 18 years , had tonsils removed and had Penicillin   Had a rash  "      DIAGNOSTICS    Labs/Scans/Micro:    CBC:   Lab Results   Component Value Date    WBC 8.2 06/07/2019    HGB 10.0 (L) 06/07/2019    HCT 31.6 (L) 06/07/2019    MCV 83.8 06/07/2019     (H) 06/07/2019     Sedimentation rate: No results found for: "SEDRATE" C-reactive protein: No results found for: "CRP" Chemistry: [unfilled]  HBA1C: No components found for: "HBA1C"    Ankle Brachial Index: No results found for this or any previous visit.    Imaging:    Plain film: No results found for this or any previous visit.    MRI: No results found for this or any previous visit.    Bone Scan: No results found for this or any previous visit.    Microbiology: Rx for Doxy sent to pharmacy      HOME HEALTH AGENCY:  NONE  WOUND CARE ORDERS:  Lower abdominal wound: Cleanse with wound cleanser, cover with silver alginate and island dressing- to be changed daily.   Keep coconut oil with vitamin E on all other incision lines daily      Follow up in about 2 weeks (around 6/4/2024) for abdominal wounds, Provider Visit.       "

## 2024-05-21 NOTE — PROCEDURES
"Debridement    Date/Time: 5/21/2024 1:05 PM    Performed by: Amalia Holloway NP  Authorized by: Amalia Holloway NP    Time out: Immediately prior to procedure a "time out" was called to verify the correct patient, procedure, equipment, support staff and site/side marked as required.    Consent Done?:  Yes (Verbal)    Preparation: Patient was prepped and draped with clean technique    Local anesthesia used?: No      Wound Details:    Location:  Abdomen (Left lower)    Type of Debridement:  Non-excisional       Length (cm):  0.4       Area (sq cm):  0.8       Width (cm):  2       Percent Debrided (%):  100       Depth (cm):  0.2       Total Area Debrided (sq cm):  0.8    Depth of debridement:  Epidermis/Dermis    Devitalized tissue debrided:  Biofilm, Callus, Exudate, Fibrin and Slough    Instruments:  Forceps and Scissors  Bleeding:  None  Patient tolerance:  Patient tolerated the procedure well with no immediate complications    "

## 2024-05-23 ENCOUNTER — DOCUMENTATION ONLY (OUTPATIENT)
Dept: WOUND CARE | Facility: HOSPITAL | Age: 44
End: 2024-05-23
Payer: MEDICAID

## 2024-05-23 RX ORDER — CLINDAMYCIN HYDROCHLORIDE 300 MG/1
300 CAPSULE ORAL EVERY 6 HOURS
Qty: 28 CAPSULE | Refills: 0 | Status: SHIPPED | OUTPATIENT
Start: 2024-05-23 | End: 2024-05-30

## 2024-05-23 NOTE — PROGRESS NOTES
Patient notified of her cx results from abdominal wound.  Mixed bacteria.  Clindamycin Rx has been sent -  today and begin.  She acknowledged understanding (CT)

## 2024-06-04 ENCOUNTER — HOSPITAL ENCOUNTER (OUTPATIENT)
Dept: WOUND CARE | Facility: HOSPITAL | Age: 44
Discharge: HOME | End: 2024-06-04
Attending: NURSE PRACTITIONER
Payer: MEDICAID

## 2024-06-04 VITALS
BODY MASS INDEX: 28.55 KG/M2 | DIASTOLIC BLOOD PRESSURE: 63 MMHG | WEIGHT: 203.94 LBS | HEIGHT: 71 IN | HEART RATE: 82 BPM | SYSTOLIC BLOOD PRESSURE: 118 MMHG | RESPIRATION RATE: 18 BRPM

## 2024-06-04 DIAGNOSIS — Z78.9 PRESENCE OF SURGICAL INCISION: ICD-10-CM

## 2024-06-04 DIAGNOSIS — T81.32XD DEHISCENCE OF INTERNAL SURGICAL INCISION, SUBSEQUENT ENCOUNTER: ICD-10-CM

## 2024-06-04 DIAGNOSIS — Z98.890 HISTORY OF COSMETIC SURGERY: ICD-10-CM

## 2024-06-04 DIAGNOSIS — Z51.89 VISIT FOR WOUND CHECK: Primary | ICD-10-CM

## 2024-06-04 PROCEDURE — 27000999 HC MEDICAL RECORD PHOTO DOCUMENTATION

## 2024-06-04 PROCEDURE — 99213 OFFICE O/P EST LOW 20 MIN: CPT

## 2024-06-04 NOTE — PROGRESS NOTES
"  Subjective:       Patient ID: Btety Faustin is a 43 y.o. female.    Chief Complaint: Wound Care ((Left lower abdomen /Midline lower abdomen))    HPI  3/26/24 - Ms. Faustin is a 43 year old  female who is being seen for multiple surgical incisions.  These incisions are the result of cosmetic surgery procedures which occurred in [place] on 3/6/24.  She was discharged to home following surgery for an oral abx.  She does not know the name of that abx but completed it three days ago.  She has multiple incisions that are, for the most part, approximated.  She does still have many staples in place as well as a LINUS drain on each side of her lower abdomen.  She was told to "pull" the drains when there is less that 25 cc of exudate per day.  She reports that she is still significantly above that amount today.   The exudate and drains were not assessed today by this provider.  Her procedures included arm lift (Brachioplasty), Matilda de lis Abdominoplasty and a  reverse tummy tuck.  She was instructed to begin application of Betadine on all incision lines.  We will plan to debride the fibrin at her next visit as well as remove stables that appear ready.  There is a small dehisced area are the left lower abdominal quadrant.  There is also erythema at the back incision.  This may be due to friction as she is only wearing a T-shirt.  She was instructed to begin using an ABD pad over the back incision to prevent irritation.  Out of an abundance of caution, an Rx for Doxycycline, 10 days, was sent to her pharmacy.    4/2/24 - The patient returns today for f/up on the multiple surgical incisions resulting from cosmetic surgery.  The majority of the areas are progressing well.  She does still have irritation and erythema across the upper back which is likely due to friction sitting in chairs, etc.  Additionally there is one concerning area of dehiscence at the left lower quadrant, as well as under the left axilla.  She will " begin application of mupirocin on these areas and continue the doxycycline (3 days remaining).  We will reevaluate next week for possible additional abx, if indicated.  Today selective debridement was done of a large portion of the incisions, removal fibrin.  Also, multiple staples and sutures were removed.  She does still have both LINUS drains on either side of her lower abdomen and does report that she still has approximately 100 cc of exudate per day.  They are to be pulled at 25 cc per day.     4/16/24 - Ms. Faustin returns for f/up on her cosmetic surgical incisions.  Several scattered areas were debrided, including the upper back which does have two areas of dehiscence.  The right inferior breast has open areas as well.  The most concerning area is the left lower quadrant of the abdomen.  This opening was debrided, with removal of a staple and suture, both of which were imbedded in the wound.  A culture was obtained and if there are no pathogens we will staple the wound closed at her next visit.  She will continue to apply mupirocin on all of the small open areas except for the left lower abdomen.  She will apply coconut oil with vitamin e on all closed areas.      4/23/24 - All areas continue to improve including the most concerning area at the left lower abdomen.  This area is improving since removal of a staple at her last visit.  Today we will begin application of mesalt to the wound bed.  She began levofloxacin on 4/18/24 which is also improving the incision overall.  Today approximately 8 additional sutures were palpated and removed from the incision line.  She does still have the LINUS drains, and reports approximately 35 cc of drainage in each, daily.  We will remove at 25 cc per day.  She will continue mupirocin at all other open areas.      5/7/24 - the patient returns today for followup on the surgical incisions that she has from cosmetic surgery.  Several of the areas were assessed and 3 additional  sutures were removed from the incision line.  The area to the left lower abdomen continues to improve and has decreased in size considerably.  There are very small scattered open areas along the incision line that she will continue to monitor, but there are no signs and symptoms of infection or concern at this time.  She has completed the p.o. Levaquin.  She also reports that she was able to successfully remove the 2 LINUS drains on 04/28/2024 as her surgeon had instructed her to do.    5/21/24 - The wound at the left lower abdomen continues to improve.  Today that wound was selectively debrided and there remains a small area that is open.  Additionally, three retained sutures were removed from the incision line that had been closed over.  She will return to clinic in two weeks.    6/4/24 - Ms. Faustin returns to clinic today for final followup on the multiple areas surgical incisions in a couple of small areas of dehiscence.  She has completed her clindamycin and today all areas of her incisions are now healed.  Upon palpation, however, there are a couple of areas that do feel like possibly retained sutures.  We have removed multiple sutures from her incision.  These that seem to be remaining or under the epithelial tissue.  They will not be removed at this time, however, she was instructed that should the fibrous erupted through the skin or if she should feel any irritation at any of the site she is to return to clinic immediately rather than try to remove the sutures herself.  She will return to clinic only as needed.      Review of Systems      Objective:        Vitals:    06/04/24 1315   BP: 118/63   Pulse: 82   Resp: 18     Physical Exam  [REMOVED]      Altered Skin Integrity 03/26/24 1421 Left lower Abdomen #3 (Removed)   03/26/24 1421   Altered Skin Integrity Present on Admission - Did Patient arrive to the hospital with altered skin?: yes   Side: Left   Orientation: lower   Location: Abdomen   Wound Number: #3    Is this injury device related?: No   Primary Wound Type:    Description of Altered Skin Integrity:    Ankle-Brachial Index:    Pulses:    Removal Indication and Assessment:    Wound Outcome: Healed   (Retired) Wound Length (cm):    (Retired) Wound Width (cm):    (Retired) Depth (cm):    Wound Description (Comments):    Removal Indications:    Removed 06/04/24 1331   Dressing Appearance Open to air 06/04/24 1316   Drainage Amount None 06/04/24 1316   Appearance Intact;Pink;Dry 06/04/24 1316   Periwound Area Dry;Pink;Intact 06/04/24 1316   Wound Length (cm) 0 cm 06/04/24 1316   Wound Width (cm) 0 cm 06/04/24 1316   Wound Depth (cm) 0 cm 06/04/24 1316   Wound Volume (cm^3) 0 cm^3 06/04/24 1316   Wound Surface Area (cm^2) 0 cm^2 06/04/24 1316   Care Cleansed with:;Wound cleanser 06/04/24 1316       [REMOVED]      Wound 05/21/24 1351 Other (comment) midline Abdomen #2 (Removed)   05/21/24 1351   Present on Original Admission:    Primary Wound Type: Other   Side:    Orientation: midline   Location: Abdomen   Wound Approximate Age at First Assessment (Weeks):    Wound Number: #2   Is this injury device related?: No   Incision Type:    Closure Method:    Wound Description (Comments):    Type:    Additional Comments:    Ankle-Brachial Index:    Pulses:    Removal Indication and Assessment:    Wound Outcome: Healed   Removed 06/04/24 1331   Dressing Appearance Open to air 06/04/24 1316   Drainage Amount None 06/04/24 1316   Appearance Intact;Pink;Dry 06/04/24 1316   Periwound Area Dry;Pink;Intact 06/04/24 1316   Wound Length (cm) 0 cm 06/04/24 1316   Wound Width (cm) 0 cm 06/04/24 1316   Wound Depth (cm) 0 cm 06/04/24 1316   Wound Volume (cm^3) 0 cm^3 06/04/24 1316   Wound Surface Area (cm^2) 0 cm^2 06/04/24 1316   Care Cleansed with:;Wound cleanser 06/04/24 1316         6/4/24       Left lower abdomen (pre)                                 Midline abdomen (pre)        Assessment:           ICD-10-CM ICD-9-CM   1. Visit for  "wound check  Z51.89 V58.89   2. Dehiscence of internal surgical incision, subsequent encounter  T81.32XD V58.89     998.31   3. Presence of surgical incision  Z78.9 V49.89   4. History of cosmetic surgery  Z98.890 V45.89             Procedures:     Wound check only    [] Yes [] No   I & D performed  [] Yes [] No   Excisional debridement performed  [] Yes [] No   Selective debridement performed           [] Yes [] No   Mechanical debridement performed         [] Yes [] No  Silver nitrate applied                                     [] Yes [] No  Labs ordered this visit                                  [] Yes [] No   Imaging ordered this visit                           [] Yes [] No   Tissue pathology and/or culture taken          MEDICATIONS    Current Outpatient Medications:     calcium carbonate 195 mg calcium (500 mg) Chew, Take by mouth., Disp: , Rfl:     ferrous sulfate 27 mg iron Tab, Take by mouth., Disp: , Rfl:     mecobalamin, vitamin B12, 1,000 mcg Chew, Take by mouth., Disp: , Rfl:     mupirocin (BACTROBAN) 2 % ointment, Apply topically once daily., Disp: 30 g, Rfl: 1    pantoprazole (PROTONIX) 40 MG tablet, Take 40 mg by mouth every evening. , Disp: , Rfl: 11    tranexamic acid (LYSTEDA) 650 mg tablet, SMARTSI-2 Tablet(s) By Mouth Every 12 Hours, Disp: , Rfl:    Review of patient's allergies indicates:   Allergen Reactions    Penicillins      At age 18 years , had tonsils removed and had Penicillin   Had a rash       DIAGNOSTICS    Labs/Scans/Micro:    CBC:   Lab Results   Component Value Date    WBC 8.2 2019    HGB 10.0 (L) 2019    HCT 31.6 (L) 2019    MCV 83.8 2019     (H) 2019     Sedimentation rate: No results found for: "SEDRATE" C-reactive protein: No results found for: "CRP" Chemistry: [unfilled]  HBA1C: No components found for: "HBA1C"    Ankle Brachial Index: No results found for this or any previous visit.    Imaging:    Plain film: No results found for " this or any previous visit.    MRI: No results found for this or any previous visit.    Bone Scan: No results found for this or any previous visit.    Microbiology: Rx for Doxy sent to pharmacy      HOME HEALTH AGENCY:  NONE  WOUND CARE ORDERS:     Keep coconut oil with vitamin E on all incision lines daily      Follow up if symptoms worsen or fail to improve.

## 2024-07-09 ENCOUNTER — HOSPITAL ENCOUNTER (OUTPATIENT)
Dept: RADIOLOGY | Facility: HOSPITAL | Age: 44
Discharge: HOME | End: 2024-07-09
Attending: UROLOGY
Payer: MEDICAID

## 2024-07-09 ENCOUNTER — OFFICE VISIT (OUTPATIENT)
Dept: UROLOGY | Facility: CLINIC | Age: 44
End: 2024-07-09
Payer: MEDICAID

## 2024-07-09 VITALS
DIASTOLIC BLOOD PRESSURE: 66 MMHG | HEIGHT: 71 IN | SYSTOLIC BLOOD PRESSURE: 108 MMHG | HEART RATE: 85 BPM | BODY MASS INDEX: 25.76 KG/M2 | WEIGHT: 184 LBS

## 2024-07-09 DIAGNOSIS — N20.0 KIDNEY STONES: ICD-10-CM

## 2024-07-09 DIAGNOSIS — C64.2 RENAL CELL CARCINOMA OF LEFT KIDNEY: Primary | ICD-10-CM

## 2024-07-09 DIAGNOSIS — C64.2 RENAL CELL CARCINOMA OF LEFT KIDNEY: ICD-10-CM

## 2024-07-09 PROCEDURE — A9585 GADOBUTROL INJECTION: HCPCS | Performed by: UROLOGY

## 2024-07-09 PROCEDURE — 74183 MRI ABD W/O CNTR FLWD CNTR: CPT | Mod: 26,,, | Performed by: INTERNAL MEDICINE

## 2024-07-09 PROCEDURE — 1159F MED LIST DOCD IN RCRD: CPT | Mod: CPTII,,, | Performed by: UROLOGY

## 2024-07-09 PROCEDURE — 3008F BODY MASS INDEX DOCD: CPT | Mod: CPTII,,, | Performed by: UROLOGY

## 2024-07-09 PROCEDURE — 3074F SYST BP LT 130 MM HG: CPT | Mod: CPTII,,, | Performed by: UROLOGY

## 2024-07-09 PROCEDURE — 1160F RVW MEDS BY RX/DR IN RCRD: CPT | Mod: CPTII,,, | Performed by: UROLOGY

## 2024-07-09 PROCEDURE — 99999 PR PBB SHADOW E&M-EST. PATIENT-LVL III: CPT | Mod: PBBFAC,,, | Performed by: UROLOGY

## 2024-07-09 PROCEDURE — 71046 X-RAY EXAM CHEST 2 VIEWS: CPT | Mod: 26,,, | Performed by: RADIOLOGY

## 2024-07-09 PROCEDURE — 3078F DIAST BP <80 MM HG: CPT | Mod: CPTII,,, | Performed by: UROLOGY

## 2024-07-09 PROCEDURE — 71046 X-RAY EXAM CHEST 2 VIEWS: CPT | Mod: TC

## 2024-07-09 PROCEDURE — 74183 MRI ABD W/O CNTR FLWD CNTR: CPT | Mod: TC

## 2024-07-09 PROCEDURE — 99214 OFFICE O/P EST MOD 30 MIN: CPT | Mod: S$PBB,,, | Performed by: UROLOGY

## 2024-07-09 PROCEDURE — 99213 OFFICE O/P EST LOW 20 MIN: CPT | Mod: PBBFAC,25 | Performed by: UROLOGY

## 2024-07-09 PROCEDURE — 25500020 PHARM REV CODE 255: Performed by: UROLOGY

## 2024-07-09 RX ORDER — GADOBUTROL 604.72 MG/ML
10 INJECTION INTRAVENOUS
Status: COMPLETED | OUTPATIENT
Start: 2024-07-09 | End: 2024-07-09

## 2024-07-09 RX ADMIN — GADOBUTROL 10 ML: 604.72 INJECTION INTRAVENOUS at 01:07

## 2024-07-09 NOTE — PROGRESS NOTES
Subjective:       Patient ID: Betty Faustin is a 43 y.o. female.    Chief Complaint:  Follow-up.      History of Present Illness  HPI  Patient is a 43 y.o. female who is established to our clinic and was originally referred by their urologist, Dr. Villarreal for evaluation of a left renal mass.   She developed diarrhea and GI upset which prompted abdominal imaging.  She had an incidental finding of a left renal mass.    Underwent a left robotic partial nephrectomy 5/31/19.      She returns for surveillance imaging review.    CXR from 7/9/24 was independently reviewed today and reveals no metastatic disease.   MRI of the abdomen from 7/9/24 was independently reviewed today and shared with the patient and shows no evidence of recurrence or mets. Official report pending.       No cross-sectional abdominal imaging.   Denies gross hematuria.  No unexplained weight loss.           PATHOLOGY:  SPECIMEN  1) Left kidney tumor.  FINAL PATHOLOGIC DIAGNOSIS  Left kidney, tumor, partial nephrectomy:  Clear cell renal cell carcinoma, measuring 2.2 cm in greatest dimension.  Synoptic comment.  Comment: Synoptic report  Specimen: Left kidney  Specimen laterality: Left  Procedure: Partial nephrectomy  Tumor size:  Greatest dimension: 2.2 cm  Tumor focality: Unifocal  Histologic type: Clear cell renal cell carcinoma  Sarcomatoid features: Not identified  Rhabdoid features: Not identified  Histologic grade: Viji nuclear grade  G2: Nucleoli conspicuous and eosinophilic at 400x magnification, visible but not prominent at 100x magnification  Tumor necrosis: Not identified  Anatomic extent of tumor: Tumor limited to the kidney  Margins: Uninvolved by invasive carcinoma  Regional Lymph Nodes: No lymph nodes submitted or found  Pathologic staging:  Primary tumor:  pT1a: Tumor greater than or equal to 4 cm in greatest dimension, limited to the kidney  Regional lymph nodes:  pNX: Regional lymph nodes cannot be assessed  Distant  metastasis:  pMX: Cannot be assessed.  Pathologic findings in nonneoplastic kidney: Not identified.    Review of Systems  Review of Systems  All other systems reviewed and negative except pertinent positives noted in HPI.       Objective:     Physical Exam  Constitutional:       General: She is not in acute distress.     Appearance: She is well-developed.   HENT:      Head: Normocephalic and atraumatic.   Eyes:      General: No scleral icterus.  Neck:      Trachea: No tracheal deviation.   Pulmonary:      Effort: Pulmonary effort is normal. No respiratory distress.   Neurological:      Mental Status: She is alert and oriented to person, place, and time.   Psychiatric:         Behavior: Behavior normal.         Thought Content: Thought content normal.         Judgment: Judgment normal.         Lab Review  Lab Results   Component Value Date    COLORU Yellow 07/14/2021    SPECGRAV 1.015 07/14/2021    PHUR 6.0 07/14/2021    NITRITE Negative 07/14/2021    KETONESU Negative 07/14/2021    UROBILINOGEN >=8.0 06/03/2019         Assessment:        1. Renal cell carcinoma of left kidney    2. Kidney stones                Plan:     Renal cell carcinoma of left kidney    Kidney stones           1. RCC:  -previously placed referral to James Chan NP for genetic counseling per A guidelines.  This has not occurred yet and patient is not sure she is interested despite guideline recommendations.   -CXR was independently reviewed today and reveals no abnormalities  -plan for MRI abdomen==== will notify of results.   -plan cmp, mri abd, and cxr in 1 year.   NCCN guidelines shifted to annual imaging through 5 years of the abdomen.  Will arrange mri in [place].     2. Kidney stones  -General risk factors for kidney stones and the conservative measures to prevent kidney stones in the future were discussed with the patient in detail.  The patient was encouraged to drink 2-3 liters of water a day, limit iced tea and glo as well as  foods high in oxalate.  They were cautioned to try to limit salt and red meat intake.  We also discussed adding citrate to the diet with the addition of timothy or lemon juice to their water or alternatively with crystal light.   -no new imaging.

## 2025-05-08 NOTE — PROGRESS NOTES
Patient ID: [id number]   Chief Complaint:   Chief Complaint   Patient presents with    Annual Exam     NO C/O'S.     HPI:   Betty Faustin is a 44 y.o. year old  here for her Annual Exam.   She is doing well. Denies any health changes.   Patient's last menstrual period was 2025.   Annual Exam (NO C/O'S.)    Subjective:     Past Medical History:   Diagnosis Date    GERD (gastroesophageal reflux disease)     Hypertension     Visit for wound check 2024     Past Surgical History:   Procedure Laterality Date     SECTION      ,      CHOLECYSTECTOMY  2002    ROBOT-ASSISTED LAPAROSCOPIC PARTIAL NEPHRECTOMY USING DA ALEJO XI Left 2019    Procedure: XI ROBOTIC NEPHRECTOMY, PARTIAL;  Surgeon: Srinath Killian MD;  Location: [facility] OR [address] [place] [address];  Service: Urology;  Laterality: Left;  4hrs  [place] u/s probe confirmation#[id number] NC for 12pm case    TONSILLECTOMY      TUBAL LIGATION       Social History[1]  Family History   Problem Relation Name Age of Onset    Valvular heart disease Mother      Breast cancer Mother       OB History    Para Term  AB Living   2 2 2   2   SAB IAB Ectopic Multiple Live Births       2      # Outcome Date GA Lbr Ludin/2nd Weight Sex Type Anes PTL Lv   2 Term 17 39w0d  3.175 kg (7 lb) M Vag-Spont EPI N SANTI   1 Term 08 39w0d  2.722 kg (6 lb) M Vag-Spont EPI N SANTI     Current Medications[2]    MENARCHEAL:  Cycle Length: 4 days   Flow: normal  Dysmenorrhea: No  Intermenstrual Bleeding: No  PAP:  Last PAP: 2024    History of Abnormal PAP Smear: YES:     Treated: UNSURE  HPV Vaccine: NO  INTERCOURSE:  Dyspareunia: No  Postcoital Bleeding: No  History of STI: No   If yes, then: No   Current Birth Control Method: tubal ligation  Sexually Active: yes  BREAST HISTORY:   Last Mammogram: 2023 DR ALMONTE  History of Abnormal Mammogram: YES:    MENOPAUSE:  Post Menopausal Bleeding: No  Hormone Replacement Therapy: No    Review of Systems 12  point review of systems conducted, negative except as stated in the history of present illness.     See HPI for details.  Objective:   Visit Vitals  /70   Pulse 80   Wt 98 kg (216 lb)   LMP 05/02/2025   BMI 30.13 kg/m²     No results found for this or any previous visit (from the past 24 hours).  Physical Exam:  Chaperone present for exam.  Constitutional:  General Appearance : alert, in no acute distress, normal, well nourished.  Cardiovascular:   Regular rate and rhythm.  Respiratory:  Respiratory Effort: normal.    Breast:  Right: Inspection/palpation: no discharge, no masses present, no nipple retraction, no skin changes, no skin dimpling, no tenderness, no lymphadenopathy, no axillary mass, no axillary tenderness.  Left: Inspection/palpation: no discharge, no masses present, no nipple retraction, no skin changes, no skin dimpling, no tenderness, no lymphadenopathy, no axillary mass, no axillary tenderness.    Gastrointestinal:  Abdomen: no masses. no tender, nondistended.    Genitourinary:  External Genitalia: normal, no lesions.  Vagina: normal appearance, no abnormal discharge, no lesions.  Bladder: no mass, nontender.  Urethra: no erythema or lesions present.  Cervix: no lesions, non tender. Pap Done DECLINED STD TESTING  Uterus: nontender, normal contour, normal mobility, normal size.   Adnexa: no masses, no tenderness.  Anus and Perineum: visually normal.     No results found for this or any previous visit (from the past 24 hours).  Assessment/Plan:   Assessment:   Encounter for annual routine gynecological examination  -     Liquid-Based Pap Smear, Screening  -     High Risk HPV    Encounter for screening mammogram for breast cancer  -     Mammo Digital Screening Bilat; Future; Expected date: 05/28/2025    Encounter for screening for cervical cancer  -     Liquid-Based Pap Smear, Screening  -     High Risk HPV      Follow up in about 1 year (around 5/14/2026) for ANNUAL.     In addition to their  scheduled follow-up, the patient has also been instructed to follow up on as needed basis.   All questions were answered and the patient voiced understanding of the above issues.      This note was transcribed by Ольга Garcia. There may be transcription errors as a result, however minimal. Effort has been made to ensure accuracy of transcription, but any obvious errors or omissions should be clarified with the author of the document.     I agree with the above documentation.            [1]   Social History  Tobacco Use    Smoking status: Never    Smokeless tobacco: Never   Substance Use Topics    Alcohol use: Yes     Comment: 1-2 times a year     Drug use: Never   [2] No current outpatient medications on file.

## 2025-05-14 ENCOUNTER — OFFICE VISIT (OUTPATIENT)
Dept: OBSTETRICS AND GYNECOLOGY | Facility: CLINIC | Age: 45
End: 2025-05-14
Payer: MEDICAID

## 2025-05-14 VITALS
HEART RATE: 80 BPM | SYSTOLIC BLOOD PRESSURE: 130 MMHG | DIASTOLIC BLOOD PRESSURE: 70 MMHG | BODY MASS INDEX: 30.13 KG/M2 | WEIGHT: 216 LBS

## 2025-05-14 DIAGNOSIS — Z12.31 ENCOUNTER FOR SCREENING MAMMOGRAM FOR BREAST CANCER: ICD-10-CM

## 2025-05-14 DIAGNOSIS — Z01.419 ENCOUNTER FOR ANNUAL ROUTINE GYNECOLOGICAL EXAMINATION: Primary | ICD-10-CM

## 2025-05-14 DIAGNOSIS — Z12.4 ENCOUNTER FOR SCREENING FOR CERVICAL CANCER: ICD-10-CM

## 2025-05-22 LAB
HIGH RISK HPV: NEGATIVE
PSYCHE PATHOLOGY RESULT: NORMAL

## 2025-05-28 ENCOUNTER — RESULTS FOLLOW-UP (OUTPATIENT)
Dept: OBSTETRICS AND GYNECOLOGY | Facility: CLINIC | Age: 45
End: 2025-05-28
Payer: MEDICAID

## 2025-07-08 ENCOUNTER — OFFICE VISIT (OUTPATIENT)
Dept: UROLOGY | Facility: CLINIC | Age: 45
End: 2025-07-08
Payer: MEDICAID

## 2025-07-08 ENCOUNTER — HOSPITAL ENCOUNTER (OUTPATIENT)
Dept: RADIOLOGY | Facility: HOSPITAL | Age: 45
Discharge: HOME | End: 2025-07-08
Attending: UROLOGY
Payer: MEDICAID

## 2025-07-08 VITALS
DIASTOLIC BLOOD PRESSURE: 66 MMHG | HEIGHT: 71 IN | WEIGHT: 220.44 LBS | HEART RATE: 66 BPM | SYSTOLIC BLOOD PRESSURE: 104 MMHG | BODY MASS INDEX: 30.86 KG/M2

## 2025-07-08 DIAGNOSIS — C64.2 RENAL CELL CARCINOMA OF LEFT KIDNEY: Primary | ICD-10-CM

## 2025-07-08 DIAGNOSIS — C64.2 RENAL CELL CARCINOMA OF LEFT KIDNEY: ICD-10-CM

## 2025-07-08 DIAGNOSIS — N20.0 KIDNEY STONES: ICD-10-CM

## 2025-07-08 PROCEDURE — A9585 GADOBUTROL INJECTION: HCPCS | Performed by: UROLOGY

## 2025-07-08 PROCEDURE — 3074F SYST BP LT 130 MM HG: CPT | Mod: CPTII,,, | Performed by: UROLOGY

## 2025-07-08 PROCEDURE — 3008F BODY MASS INDEX DOCD: CPT | Mod: CPTII,,, | Performed by: UROLOGY

## 2025-07-08 PROCEDURE — 1159F MED LIST DOCD IN RCRD: CPT | Mod: CPTII,,, | Performed by: UROLOGY

## 2025-07-08 PROCEDURE — 74183 MRI ABD W/O CNTR FLWD CNTR: CPT | Mod: 26,,, | Performed by: RADIOLOGY

## 2025-07-08 PROCEDURE — 99214 OFFICE O/P EST MOD 30 MIN: CPT | Mod: S$PBB,,, | Performed by: UROLOGY

## 2025-07-08 PROCEDURE — 74183 MRI ABD W/O CNTR FLWD CNTR: CPT | Mod: TC

## 2025-07-08 PROCEDURE — 99999 PR PBB SHADOW E&M-EST. PATIENT-LVL III: CPT | Mod: PBBFAC,,, | Performed by: UROLOGY

## 2025-07-08 PROCEDURE — 25500020 PHARM REV CODE 255: Performed by: UROLOGY

## 2025-07-08 PROCEDURE — 71046 X-RAY EXAM CHEST 2 VIEWS: CPT | Mod: 26,,, | Performed by: STUDENT IN AN ORGANIZED HEALTH CARE EDUCATION/TRAINING PROGRAM

## 2025-07-08 PROCEDURE — 99213 OFFICE O/P EST LOW 20 MIN: CPT | Mod: PBBFAC,25 | Performed by: UROLOGY

## 2025-07-08 PROCEDURE — 71046 X-RAY EXAM CHEST 2 VIEWS: CPT | Mod: TC

## 2025-07-08 PROCEDURE — 1160F RVW MEDS BY RX/DR IN RCRD: CPT | Mod: CPTII,,, | Performed by: UROLOGY

## 2025-07-08 PROCEDURE — 3078F DIAST BP <80 MM HG: CPT | Mod: CPTII,,, | Performed by: UROLOGY

## 2025-07-08 PROCEDURE — G2211 COMPLEX E/M VISIT ADD ON: HCPCS | Mod: ,,, | Performed by: UROLOGY

## 2025-07-08 RX ORDER — GADOBUTROL 604.72 MG/ML
10 INJECTION INTRAVENOUS
Status: COMPLETED | OUTPATIENT
Start: 2025-07-08 | End: 2025-07-08

## 2025-07-08 RX ADMIN — GADOBUTROL 10 ML: 604.72 INJECTION INTRAVENOUS at 08:07

## 2025-07-08 NOTE — PROGRESS NOTES
Subjective:       Patient ID: Betty Faustin is a 44 y.o. female.    Chief Complaint: RCC Follow-up.      History of Present Illness  HPI  Patient is a 44 y.o. female who is established to our clinic and was originally referred by their urologist, Dr. Villarreal for evaluation of a left renal mass.   She developed diarrhea and GI upset which prompted abdominal imaging.  She had an incidental finding of a left renal mass.    Underwent a left robotic partial nephrectomy 5/31/19.      She returns for surveillance imaging review.    CXR from 7/8/25 was independently reviewed today and reveals no metastatic disease.   MRI of the abdomen from 7/8/25 was independently reviewed today and shared with the patient and shows no evidence of recurrence or mets. Official report pending.       No cross-sectional abdominal imaging.   Denies gross hematuria.  No unexplained weight loss.           PATHOLOGY 5/31/19:  SPECIMEN  1) Left kidney tumor.  FINAL PATHOLOGIC DIAGNOSIS  Left kidney, tumor, partial nephrectomy:  Clear cell renal cell carcinoma, measuring 2.2 cm in greatest dimension.  Synoptic comment.  Comment: Synoptic report  Specimen: Left kidney  Specimen laterality: Left  Procedure: Partial nephrectomy  Tumor size:  Greatest dimension: 2.2 cm  Tumor focality: Unifocal  Histologic type: Clear cell renal cell carcinoma  Sarcomatoid features: Not identified  Rhabdoid features: Not identified  Histologic grade: Viji nuclear grade  G2: Nucleoli conspicuous and eosinophilic at 400x magnification, visible but not prominent at 100x magnification  Tumor necrosis: Not identified  Anatomic extent of tumor: Tumor limited to the kidney  Margins: Uninvolved by invasive carcinoma  Regional Lymph Nodes: No lymph nodes submitted or found  Pathologic staging:  Primary tumor:  pT1a: Tumor greater than or equal to 4 cm in greatest dimension, limited to the kidney  Regional lymph nodes:  pNX: Regional lymph nodes cannot be assessed  Distant  metastasis:  pMX: Cannot be assessed.  Pathologic findings in nonneoplastic kidney: Not identified.    Review of Systems  Review of Systems  All other systems reviewed and negative except pertinent positives noted in HPI.       Objective:     Physical Exam  Constitutional:       General: She is not in acute distress.     Appearance: She is well-developed.   HENT:      Head: Normocephalic and atraumatic.   Eyes:      General: No scleral icterus.  Neck:      Trachea: No tracheal deviation.   Pulmonary:      Effort: Pulmonary effort is normal. No respiratory distress.   Neurological:      Mental Status: She is alert and oriented to person, place, and time.   Psychiatric:         Behavior: Behavior normal.         Thought Content: Thought content normal.         Judgment: Judgment normal.         Lab Review  Lab Results   Component Value Date    COLORU Yellow 07/14/2021    SPECGRAV 1.015 07/14/2021    PHUR 6.0 07/14/2021    NITRITE Negative 07/14/2021    KETONESU Negative 07/14/2021    UROBILINOGEN >=8.0 06/03/2019         Assessment:        1. Renal cell carcinoma of left kidney    2. Kidney stones                  Plan:     Renal cell carcinoma of left kidney    Kidney stones             1. RCC:  -previously placed referral to James Chan NP for genetic counseling per [facility] guidelines.  This has not occurred yet and patient is not sure she is interested despite guideline recommendations.   --imaging reviewed as per HPI.     -plan cmp, mri abd, and cxr in 1 year.   NCCN guidelines shifted to annual imaging through 5 years of the abdomen.  Patient prefers continued surveillance, which is reasonable given her age at presentation.    2. Kidney stones  -General risk factors for kidney stones and the conservative measures to prevent kidney stones in the future were discussed with the patient in detail.  The patient was encouraged to drink 2-3 liters of water a day, limit iced tea and glo as well as foods high in oxalate.   They were cautioned to try to limit salt and red meat intake.  We also discussed adding citrate to the diet with the addition of timothy or lemon juice to their water or alternatively with crystal light.         - code applied: patient requires or will require a continuous, longitudinal, and active collaborative plan of care related to this patient's health condition, rcc --the management of which requires the direction of a practitioner with specialized clinical knowledge, skill, and expertise.

## (undated) DEVICE — NDL 18GA X1 1/2 REG BEVEL

## (undated) DEVICE — TROCAR ENDOPATH XCEL 5X100MM

## (undated) DEVICE — SUT 1 36IN PDS II VIO MONO

## (undated) DEVICE — KIT PROCEDURE STER INLET CLOSU

## (undated) DEVICE — SEAL UNIVERSAL 5MM-8MM XI

## (undated) DEVICE — DRESSING ABSRBNT ISLAND 3.6X8

## (undated) DEVICE — SYR 30CC LUER LOCK

## (undated) DEVICE — BLADE SURG #15 CARBON STEEL

## (undated) DEVICE — SUT CTD VICRYL 0 UND BR CT

## (undated) DEVICE — SEE MEDLINE ITEM 152622

## (undated) DEVICE — IRRIGATOR ENDOSCOPY DISP.

## (undated) DEVICE — DRAPE COLUMN DAVINCI XI

## (undated) DEVICE — TRAY MINOR GEN SURG

## (undated) DEVICE — SUT ABS CLIP LAPRA-TY CTD

## (undated) DEVICE — SUT CTD VICRYL VIL BR SH 27

## (undated) DEVICE — SUT ETHILON 2-0 PSLX 30IN

## (undated) DEVICE — SYR SLIP TIP 20CC

## (undated) DEVICE — SPONGE LAP STRL 4X18 5/PK

## (undated) DEVICE — HEMOSTAT SURGICEL 4X8IN

## (undated) DEVICE — DRAPE ABDOMINAL TIBURON 14X11

## (undated) DEVICE — NDL INSUF ULTRA VERESS 120MM

## (undated) DEVICE — SPONGE LAP 4X18 PREWASHED

## (undated) DEVICE — SOL NS 1000CC

## (undated) DEVICE — NDL 22GA X1 1/2 REG BEVEL

## (undated) DEVICE — SUT PROLENE 4-0 RB-1 BL MO

## (undated) DEVICE — COVER TIP CURVED SCISSORS XI

## (undated) DEVICE — DRAPE ARM DAVINCI XI

## (undated) DEVICE — PROBE ULTRASOUND ROBOTIC PRO

## (undated) DEVICE — DRAPE SCOPE PILLOW WARMER

## (undated) DEVICE — TAPE SILK 3IN

## (undated) DEVICE — GOWN SURGICAL X-LARGE

## (undated) DEVICE — GOWN SMART IMP BREATHABLE XXLG

## (undated) DEVICE — BAG TISS RETRV MONARCH 10MM

## (undated) DEVICE — ADHESIVE DERMABOND ADVANCED

## (undated) DEVICE — DRAIN CHANNEL ROUND 15FR

## (undated) DEVICE — ELECTRODE REM PLYHSV RETURN 9

## (undated) DEVICE — SOL ELECTROLUBE ANTI-STIC

## (undated) DEVICE — ULTRASOUND RENTAL

## (undated) DEVICE — KIT VUETIP TROCAR SWAB

## (undated) DEVICE — KIT POWDER ABSORBABLE GELATIN

## (undated) DEVICE — CLIP HEMO-LOK MLX LARGE LF

## (undated) DEVICE — SET TRI-LUMEN FILTERED TUBE

## (undated) DEVICE — EVACUATOR WOUND BULB 100CC

## (undated) DEVICE — COVER LIGHT HANDLE

## (undated) DEVICE — BLADE SURG CARBON STEEL SZ11

## (undated) DEVICE — SUT MCRYL PLUS 4-0 PS2 27IN

## (undated) DEVICE — TRAY FOLEY 16FR INFECTION CONT